# Patient Record
Sex: MALE | Race: BLACK OR AFRICAN AMERICAN | NOT HISPANIC OR LATINO | Employment: FULL TIME | URBAN - METROPOLITAN AREA
[De-identification: names, ages, dates, MRNs, and addresses within clinical notes are randomized per-mention and may not be internally consistent; named-entity substitution may affect disease eponyms.]

---

## 2018-12-02 ENCOUNTER — OFFICE VISIT (OUTPATIENT)
Dept: URGENT CARE | Facility: CLINIC | Age: 43
End: 2018-12-02
Payer: COMMERCIAL

## 2018-12-02 VITALS
WEIGHT: 180 LBS | DIASTOLIC BLOOD PRESSURE: 78 MMHG | BODY MASS INDEX: 25.77 KG/M2 | OXYGEN SATURATION: 98 % | HEIGHT: 70 IN | RESPIRATION RATE: 16 BRPM | SYSTOLIC BLOOD PRESSURE: 123 MMHG | TEMPERATURE: 99 F | HEART RATE: 82 BPM

## 2018-12-02 DIAGNOSIS — S05.8X1A ABRASION OF RIGHT EYE, INITIAL ENCOUNTER: Primary | ICD-10-CM

## 2018-12-02 PROCEDURE — 99203 OFFICE O/P NEW LOW 30 MIN: CPT | Mod: S$GLB,,, | Performed by: NURSE PRACTITIONER

## 2018-12-02 RX ORDER — MUPIROCIN 20 MG/G
OINTMENT TOPICAL 3 TIMES DAILY
Qty: 22 G | Refills: 0 | Status: SHIPPED | OUTPATIENT
Start: 2018-12-02

## 2018-12-02 NOTE — PROGRESS NOTES
"Subjective:       Patient ID: Silas Stallings is a 43 y.o. male.    Vitals:  height is 5' 10" (1.778 m) and weight is 81.6 kg (180 lb). His temperature is 98.6 °F (37 °C). His blood pressure is 123/78 and his pulse is 82. His respiration is 16 and oxygen saturation is 98%.     Chief Complaint: Eye Injury and Facial Injury    Pt states hit the corner of his right eye on the corner of a car door around 11 pm last night. Pt reports minimal bleeding at time of incident and denies any pain to orbital bones. Pt reports tetanus is UTD.      Eye Injury    The right eye is affected. This is a new problem. The current episode started yesterday. The problem occurs intermittently. The problem has been unchanged. The injury mechanism was a direct trauma. The pain is at a severity of 7/10. The pain is moderate. There is no known exposure to pink eye. He does not wear contacts. Pertinent negatives include no blurred vision or double vision. He has tried nothing for the symptoms.   Facial Injury    Pertinent negatives include no blurred vision.       Constitution: Negative for fatigue.   HENT: Negative for facial swelling and facial trauma.    Neck: Negative for neck stiffness.   Cardiovascular: Negative for chest trauma.   Eyes: Positive for eye trauma and eye pain. Negative for double vision and blurred vision.   Gastrointestinal: Negative for abdominal trauma, abdominal pain and rectal bleeding.   Genitourinary: Negative for hematuria, genital trauma and pelvic pain.   Musculoskeletal: Positive for pain. Negative for trauma, joint swelling, abnormal ROM of joint and pain with walking.   Skin: Negative for color change, wound, abrasion, laceration and erythema.   Neurological: Negative for dizziness, history of vertigo, light-headedness, coordination disturbances, altered mental status and loss of consciousness.   Hematologic/Lymphatic: Negative for history of bleeding disorder.   Psychiatric/Behavioral: Negative for altered " mental status.       Objective:      Physical Exam   Constitutional: He is oriented to person, place, and time. He appears well-developed and well-nourished.   HENT:   Head: Normocephalic. Head is with abrasion. Head is without contusion and without laceration.       Right Ear: External ear normal.   Left Ear: External ear normal.   Nose: Nose normal.   Mouth/Throat: Oropharynx is clear and moist.   Eyes: Conjunctivae, EOM and lids are normal. Pupils are equal, round, and reactive to light.   Neck: Trachea normal, full passive range of motion without pain and phonation normal. Neck supple.   Cardiovascular: Normal rate, regular rhythm and normal heart sounds.   Pulmonary/Chest: Effort normal and breath sounds normal. No stridor. No respiratory distress.   Musculoskeletal: Normal range of motion.   Neurological: He is alert and oriented to person, place, and time.   Skin: Skin is warm and dry. Capillary refill takes less than 2 seconds. Abrasion noted. No bruising, no burn, no ecchymosis, no laceration, no lesion and no rash noted. No erythema.   Psychiatric: He has a normal mood and affect. His speech is normal and behavior is normal. Judgment and thought content normal. Cognition and memory are normal.   Nursing note and vitals reviewed.      Assessment:       1. Abrasion of right eye, initial encounter        Plan:         Abrasion of right eye, initial encounter  -     mupirocin (BACTROBAN) 2 % ointment; Apply topically 3 (three) times daily.  Dispense: 22 g; Refill: 0      Patient Instructions       COVER WHEN IN DIRTY ENVIRONMENT     USE BACTROBAN OINTMENT THREE TIMES A DAY FOR 3 DAYS    Abrasions  Abrasions are skin scrapes. Their treatment depends on how large and deep the abrasion is.  Home care  You may be prescribed an antibiotic cream or ointment to apply to the wound. This helps prevent infection. Follow instructions when using this medicine.  General care  · To care for the abrasion, do the following  each day for as long as directed by your healthcare provider.  ¨ If you were given a bandage, change it once a day. If your bandage sticks to the wound, soak it in warm water until it loosens.  ¨ Wash the area with soap and warm water. You may do this in a sink or under a tub faucet or shower. Rinse off the soap. Then pat the area dry with a clean towel.  ¨ If antibiotic ointment or cream was prescribed, reapply it to the wound as directed. Cover the wound with a fresh nonstick bandage. If the bandage becomes wet or dirty, change it as soon as possible.  ¨ Some antibiotic ointments or cream can cause an allergic reaction or dermatitis. This may cause redness, itching and or hives. If this occurs, stop using the ointment immediately and wash off any remaining ointment. You may need to take some allergy medicine to relieve symptoms.  · You may use acetaminophen or ibuprofen to control pain unless another pain medicine was prescribed. Talk with your healthcare provider before using these medicines if you have chronic liver or kidney disease or ever had a stomach ulcer or GI bleeding. Dont use ibuprofen in children younger than six months old.  · Most skin wounds heal within 10 days. But an infection may occur even with treatment. So its important to watch the wound for signs of infection as listed below.  Follow-up care  Follow up with your healthcare provider, or as advised.  When to seek medical advice  Call your healthcare provider right away if any of these occur:  · Fever of 100.4ºF (38ºC) or higher, or as directed by your healthcare provider  · Increasing pain, redness, swelling, or drainage from the wound  · Bleeding from the wound that does not stop after a few minutes of steady, firm pressure  · Decreased ability to move any body part near the wound  Date Last Reviewed: 3/3/2017  © 5863-9124 [employer]. [address] [place] [address], [place] [address]. All rights reserved. This information is not  intended as a substitute for professional medical care. Always follow your healthcare professional's instructions.

## 2018-12-02 NOTE — PATIENT INSTRUCTIONS
COVER WHEN IN DIRTY ENVIRONMENT     USE BACTROBAN OINTMENT THREE TIMES A DAY FOR 3 DAYS    Abrasions  Abrasions are skin scrapes. Their treatment depends on how large and deep the abrasion is.  Home care  You may be prescribed an antibiotic cream or ointment to apply to the wound. This helps prevent infection. Follow instructions when using this medicine.  General care  · To care for the abrasion, do the following each day for as long as directed by your healthcare provider.  ¨ If you were given a bandage, change it once a day. If your bandage sticks to the wound, soak it in warm water until it loosens.  ¨ Wash the area with soap and warm water. You may do this in a sink or under a tub faucet or shower. Rinse off the soap. Then pat the area dry with a clean towel.  ¨ If antibiotic ointment or cream was prescribed, reapply it to the wound as directed. Cover the wound with a fresh nonstick bandage. If the bandage becomes wet or dirty, change it as soon as possible.  ¨ Some antibiotic ointments or cream can cause an allergic reaction or dermatitis. This may cause redness, itching and or hives. If this occurs, stop using the ointment immediately and wash off any remaining ointment. You may need to take some allergy medicine to relieve symptoms.  · You may use acetaminophen or ibuprofen to control pain unless another pain medicine was prescribed. Talk with your healthcare provider before using these medicines if you have chronic liver or kidney disease or ever had a stomach ulcer or GI bleeding. Dont use ibuprofen in children younger than six months old.  · Most skin wounds heal within 10 days. But an infection may occur even with treatment. So its important to watch the wound for signs of infection as listed below.  Follow-up care  Follow up with your healthcare provider, or as advised.  When to seek medical advice  Call your healthcare provider right away if any of these occur:  · Fever of 100.4ºF (38ºC) or  higher, or as directed by your healthcare provider  · Increasing pain, redness, swelling, or drainage from the wound  · Bleeding from the wound that does not stop after a few minutes of steady, firm pressure  · Decreased ability to move any body part near the wound  Date Last Reviewed: 3/3/2017  © 2550-3736 [employer]. [address] [place] [address], [place] [address]. All rights reserved. This information is not intended as a substitute for professional medical care. Always follow your healthcare professional's instructions.

## 2022-10-05 ENCOUNTER — TELEPHONE (OUTPATIENT)
Dept: SPORTS MEDICINE | Facility: CLINIC | Age: 47
End: 2022-10-05
Payer: COMMERCIAL

## 2022-10-05 NOTE — TELEPHONE ENCOUNTER
I called the patient to schedule appointment for knee pain. Mr. Stallings didn't answer. I left a message to please call back.

## 2022-10-06 ENCOUNTER — HOSPITAL ENCOUNTER (OUTPATIENT)
Dept: RADIOLOGY | Facility: HOSPITAL | Age: 47
Discharge: HOME | End: 2022-10-06
Attending: ORTHOPAEDIC SURGERY
Payer: COMMERCIAL

## 2022-10-06 ENCOUNTER — OFFICE VISIT (OUTPATIENT)
Dept: SPORTS MEDICINE | Facility: CLINIC | Age: 47
End: 2022-10-06
Payer: COMMERCIAL

## 2022-10-06 VITALS
HEIGHT: 70 IN | DIASTOLIC BLOOD PRESSURE: 86 MMHG | HEART RATE: 84 BPM | BODY MASS INDEX: 25.83 KG/M2 | SYSTOLIC BLOOD PRESSURE: 130 MMHG

## 2022-10-06 DIAGNOSIS — M17.0 BILATERAL PRIMARY OSTEOARTHRITIS OF KNEE: Primary | ICD-10-CM

## 2022-10-06 DIAGNOSIS — M25.561 PAIN IN BOTH KNEES, UNSPECIFIED CHRONICITY: ICD-10-CM

## 2022-10-06 DIAGNOSIS — M25.562 PAIN IN BOTH KNEES, UNSPECIFIED CHRONICITY: ICD-10-CM

## 2022-10-06 PROCEDURE — 99203 PR OFFICE/OUTPT VISIT, NEW, LEVL III, 30-44 MIN: ICD-10-PCS | Mod: S$GLB,,, | Performed by: ORTHOPAEDIC SURGERY

## 2022-10-06 PROCEDURE — 99999 PR PBB SHADOW E&M-EST. PATIENT-LVL III: ICD-10-PCS | Mod: PBBFAC,,, | Performed by: ORTHOPAEDIC SURGERY

## 2022-10-06 PROCEDURE — 73564 XR KNEE ORTHO BILAT WITH FLEXION: ICD-10-PCS | Mod: 26,50,, | Performed by: RADIOLOGY

## 2022-10-06 PROCEDURE — 73564 X-RAY EXAM KNEE 4 OR MORE: CPT | Mod: 26,50,, | Performed by: RADIOLOGY

## 2022-10-06 PROCEDURE — 99203 OFFICE O/P NEW LOW 30 MIN: CPT | Mod: S$GLB,,, | Performed by: ORTHOPAEDIC SURGERY

## 2022-10-06 PROCEDURE — 99999 PR PBB SHADOW E&M-EST. PATIENT-LVL III: CPT | Mod: PBBFAC,,, | Performed by: ORTHOPAEDIC SURGERY

## 2022-10-06 PROCEDURE — 73564 X-RAY EXAM KNEE 4 OR MORE: CPT | Mod: TC,50

## 2022-10-06 RX ORDER — MELOXICAM 15 MG/1
15 TABLET ORAL DAILY
Qty: 30 TABLET | Refills: 2 | Status: SHIPPED | OUTPATIENT
Start: 2022-10-06 | End: 2023-01-18 | Stop reason: SDUPTHER

## 2022-10-06 NOTE — PROGRESS NOTES
CC: Right knee pain    47 y.o. Male with a history of right knee pain with history of right ACL reconstruction 25 years ago in college.  This was followed by an arthroscopic lysis of adhesions 2 years later.  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  He complains of several years of right knee pain secondary to advanced arthritis.  Today he reports that his left knee is hurting equally as bad as his right knee.  This is affecting bilateral hips and lumbar back as well.  He is having issues ambulating.  He is having issues at work due to pain and difficulty with mobility.  He has tried corticosteroid injections, and viscosupplementation in the past with mild relief with an outside orthopedist.  He reports some mechanical symptoms.  He denies any instability..  He states that the pain is severe and not responding to any conservative care.      He reports that the pain and weakness. It also bothers him at night.    + mechanical symptoms, - instability    Is affecting ADLs.  Pain is 10/10 at it's worst.    REVIEW OF SYSTEMS:   Constitution: Negative. Negative for chills, fever and night sweats.   HENT: Negative for congestion and headaches.    Eyes: Negative for blurred vision, left vision loss and right vision loss.   Cardiovascular: Negative for chest pain and syncope.   Respiratory: Negative for cough and shortness of breath.    Endocrine: Negative for polydipsia, polyphagia and polyuria.   Hematologic/Lymphatic: Negative for bleeding problem. Does not bruise/bleed easily.   Skin: Negative for dry skin, itching and rash.   Musculoskeletal: Negative for falls. Positive for right knee pain and  muscle weakness.   Gastrointestinal: Negative for abdominal pain and bowel incontinence.   Genitourinary: Negative for bladder incontinence and nocturia.   Neurological: Negative for disturbances in coordination, loss of balance and  "seizures.   Psychiatric/Behavioral: Negative for depression. The patient does not have insomnia.    Allergic/Immunologic: Negative for hives and persistent infections.     PAST MEDICAL HISTORY:   History reviewed. No pertinent past medical history.    PAST SURGICAL HISTORY:   History reviewed. No pertinent surgical history.    FAMILY HISTORY:   History reviewed. No pertinent family history.    SOCIAL HISTORY:   Social History     Socioeconomic History    Marital status: Single   Tobacco Use    Smoking status: Every Day       MEDICATIONS:     Current Outpatient Medications:     mupirocin (BACTROBAN) 2 % ointment, Apply topically 3 (three) times daily., Disp: 22 g, Rfl: 0    ALLERGIES:   Review of patient's allergies indicates:  No Known Allergies    VITAL SIGNS:   /86   Pulse 84   Ht 5' 10" (1.778 m)   BMI 25.83 kg/m²      PHYSICAL EXAMINATION:  General:  The patient is alert and oriented x 3.  Mood is pleasant.  Observation of ears, eyes and nose reveal no gross abnormalities.  No labored breathing observed.    RIGHT KNEE EXAMINATION     OBSERVATION / INSPECTION   Gait:   Nonantalgic   Alignment:  Neutral   Scars:   None   Muscle atrophy: Mild  Effusion:  None   Warmth:  None   Discoloration:   none     TENDERNESS / CREPITUS (T / C):          T / C      T / C   Patella   - / -   Lateral joint line   - / -   Peripatellar medial  -  Medial joint line    - / -   Peripatellar lateral -  Medial plica   - / -   Patellar tendon -   Popliteal fossa  - / -   Quad tendon   -   Gastrocnemius   -   Prepatellar Bursa - / -   Quadricep   -   Tibial tubercle  -  Thigh/hamstring  -   Pes anserine/HS -  Fibula    -   ITB   - / -  Tibia     -   Tib/fib joint  - / -  LCL    -     MFC   - / -   MCL: Proximal  -    LFC   - / -    Distal   -          ROM: (* = pain)  PASSIVE   ACTIVE    Left :   5 / 0 / 145   5 / 0 / 145     Right :    5 / 0 / 120   5 / 0 / 110    Patellofemoral examination:  See above noted areas of " tenderness.   Patella position    Subluxation / dislocation: Centered           Sup. / Inf;   Normal   Crepitus (PF):    Absent   Patellar Mobility:       Medial-lateral:   Normal    Superior-inferior:  Normal    Inferior tilt   Normal    Patellar tendon:  Normal   Lateral tilt:    Normal   J-sign:     None   Patellofemoral grind:   No pain       MENISCAL SIGNS:     Pain on terminal extension:  -  Pain on terminal flexion:  -  Hipolitos maneuver:  + (for crepitus)  Squat     -     LIGAMENT EXAMINATION:  ACL / Lachman:  normal (-1 to 2mm)    PCL-Post.  drawer: normal 0 to 2mm  MCL- Valgus:  normal 0 to 2mm  LCL- Varus:  normal 0 to 2mm  Pivot shift: normal (Equal)   Dial Test: difference c/w other side   At 30° flexion: normal (< 5°)    At 90° flexion: normal (< 5°)   Reverse Pivot Shift:   normal (Equal)     STRENGTH: (* = with pain) PAINFUL SIDE   Quadricep   5/5   Hamstrin/5    EXTREMITY NEURO-VASCULAR EXAMINATION:   Sensation:  Grossly intact to light touch all dermatomal regions.   Motor Function:  Fully intact motor function at hip, knee, foot and ankle    DTRs;  quadriceps and  achilles 2+.  No clonus and downgoing Babinski.    Vascular status:  DP and PT pulses 2+, brisk capillary refill, symmetric.     OTHER FINDINGS:  none    IMAGING:    X-rays including standing, weight bearing AP and flexion bilateral knees, lateral and merchant views ordered and images reviewed by me show:    No fracture, dislocation or other pathology   Medial compartment: severe right degenerative changes   Lateral compartment: severe right degenerative changes   Patellofemoral compartment: severe right degenerative changes        ASSESSMENT:    Right Knee Pain, Probable advanced osteoarthritis     PLAN:   1. PT   2. Euflexxa prior auth  3. Referral to joints clinic       All questions were answered, pt will contact us for questions or concerns in the interim.

## 2022-10-25 ENCOUNTER — OFFICE VISIT (OUTPATIENT)
Dept: SPORTS MEDICINE | Facility: CLINIC | Age: 47
End: 2022-10-25
Payer: COMMERCIAL

## 2022-10-25 VITALS
DIASTOLIC BLOOD PRESSURE: 87 MMHG | SYSTOLIC BLOOD PRESSURE: 125 MMHG | WEIGHT: 180 LBS | HEIGHT: 70 IN | HEART RATE: 77 BPM | BODY MASS INDEX: 25.77 KG/M2

## 2022-10-25 DIAGNOSIS — M17.0 BILATERAL PRIMARY OSTEOARTHRITIS OF KNEE: Primary | ICD-10-CM

## 2022-10-25 PROCEDURE — 99999 PR PBB SHADOW E&M-EST. PATIENT-LVL III: CPT | Mod: PBBFAC,,, | Performed by: ORTHOPAEDIC SURGERY

## 2022-10-25 PROCEDURE — 99499 UNLISTED E&M SERVICE: CPT | Mod: S$GLB,,, | Performed by: ORTHOPAEDIC SURGERY

## 2022-10-25 PROCEDURE — 20610 DRAIN/INJ JOINT/BURSA W/O US: CPT | Mod: 50,S$GLB,, | Performed by: ORTHOPAEDIC SURGERY

## 2022-10-25 PROCEDURE — 20610 LARGE JOINT ASPIRATION/INJECTION: BILATERAL KNEE: ICD-10-PCS | Mod: 50,S$GLB,, | Performed by: ORTHOPAEDIC SURGERY

## 2022-10-25 PROCEDURE — 99499 NO LOS: ICD-10-PCS | Mod: S$GLB,,, | Performed by: ORTHOPAEDIC SURGERY

## 2022-10-25 PROCEDURE — 99999 PR PBB SHADOW E&M-EST. PATIENT-LVL III: ICD-10-PCS | Mod: PBBFAC,,, | Performed by: ORTHOPAEDIC SURGERY

## 2022-10-25 NOTE — PROGRESS NOTES
Patient is here for follow up of bilateral knee arthritis. Pt is requesting Euflexxa injection #1.    Patient has painful DJD of bilateral knee and has failed other conservative modalities including NSAIDS, activity modification, weight loss and rehabilitation exercises.         The prior shots were tolerated well.  RADIOGRAPHIC IMAGING:     Kellgren-Kehinde Grade 2.   PHYSICAL EXAMINATION:      General: The patient is alert and oriented x 3. Mood is pleasant.   Observation of ears, eyes and nose reveals no gross abnormalities. No   labored breathing observed.      No signs of infection or adverse reaction to knee.      Euflexxa Injection Procedure #1     A time out was performed, including verification of patient ID, procedure, site and side, availability of information and equipment, review of safety issues, and agreement with consent, the procedure site was marked.     The patient was prepped aseptically with povidone-iodine swabsticks. A diagnostic and therapeutic injection of 2cc Euflexxa was given under sterile technique using a 21.5g x 1.5 needle from the superolateral aspect of the bilateral knee joint in the supine position.       He had no adverse reactions to the medication. Pain decreased. he   was instructed to apply ice to the joint for 20 minutes and avoid strenuous activities for 24-36 hours following the injection. he   was warned of possible blood pressure changes during that time. Following that time, he can resume activities as prior to the injection.     he was reminded to call the clinic immediately for any adverse side effects as explained in clinic today.     Lot: [id number]  Exp: 9/19/2023

## 2022-10-25 NOTE — PROCEDURES
Large Joint Aspiration/Injection: bilateral knee    Date/Time: 10/25/2022 1:15 PM  Performed by: Ricky Menjivar MD  Authorized by: Ricky Menjivar MD     Consent Done?:  Yes (Verbal)  Indications:  Pain  Site marked: the procedure site was marked    Timeout: prior to procedure the correct patient, procedure, and site was verified    Prep: patient was prepped and draped in usual sterile fashion      Details:  Needle Size:  22 G  Ultrasonic Guidance for needle placement?: No    Approach:  Superior  Location:  Knee  Laterality:  Bilateral  Site:  Bilateral knee  Medications (Right):  20 mg sodium hyaluronate (EUFLEXXA) 10 mg/mL(mw 2.4 -3.6 million)  Medications (Left):  20 mg sodium hyaluronate (EUFLEXXA) 10 mg/mL(mw 2.4 -3.6 million)  Patient tolerance:  Patient tolerated the procedure well with no immediate complications

## 2022-11-03 ENCOUNTER — OFFICE VISIT (OUTPATIENT)
Dept: SPORTS MEDICINE | Facility: CLINIC | Age: 47
End: 2022-11-03
Payer: COMMERCIAL

## 2022-11-03 VITALS — WEIGHT: 180 LBS | HEIGHT: 70 IN | BODY MASS INDEX: 25.77 KG/M2

## 2022-11-03 DIAGNOSIS — M17.0 BILATERAL PRIMARY OSTEOARTHRITIS OF KNEE: Primary | ICD-10-CM

## 2022-11-03 PROCEDURE — 99499 UNLISTED E&M SERVICE: CPT | Mod: S$GLB,,, | Performed by: ORTHOPAEDIC SURGERY

## 2022-11-03 PROCEDURE — 20610 LARGE JOINT ASPIRATION/INJECTION: BILATERAL KNEE: ICD-10-PCS | Mod: 50,S$GLB,, | Performed by: ORTHOPAEDIC SURGERY

## 2022-11-03 PROCEDURE — 99999 PR PBB SHADOW E&M-EST. PATIENT-LVL II: ICD-10-PCS | Mod: PBBFAC,,, | Performed by: ORTHOPAEDIC SURGERY

## 2022-11-03 PROCEDURE — 99499 NO LOS: ICD-10-PCS | Mod: S$GLB,,, | Performed by: ORTHOPAEDIC SURGERY

## 2022-11-03 PROCEDURE — 20610 DRAIN/INJ JOINT/BURSA W/O US: CPT | Mod: 50,S$GLB,, | Performed by: ORTHOPAEDIC SURGERY

## 2022-11-03 PROCEDURE — 99999 PR PBB SHADOW E&M-EST. PATIENT-LVL II: CPT | Mod: PBBFAC,,, | Performed by: ORTHOPAEDIC SURGERY

## 2022-11-03 NOTE — PROCEDURES
Large Joint Aspiration/Injection: bilateral knee    Date/Time: 11/3/2022 9:30 AM  Performed by: Ricky Menjivar MD  Authorized by: Ricky Menjivar MD     Consent Done?:  Yes (Verbal)  Indications:  Pain  Site marked: the procedure site was marked    Timeout: prior to procedure the correct patient, procedure, and site was verified    Prep: patient was prepped and draped in usual sterile fashion      Details:  Needle Size:  22 G  Ultrasonic Guidance for needle placement?: No    Approach:  Superior  Location:  Knee  Laterality:  Bilateral  Site:  Bilateral knee  Medications (Right):  20 mg sodium hyaluronate (EUFLEXXA) 10 mg/mL(mw 2.4 -3.6 million)  Medications (Left):  20 mg sodium hyaluronate (EUFLEXXA) 10 mg/mL(mw 2.4 -3.6 million)  Patient tolerance:  Patient tolerated the procedure well with no immediate complications

## 2022-11-03 NOTE — PROGRESS NOTES
Patient is here for follow up of bilateral knee arthritis. Pt is requesting Euflexxa injection #2.    Patient has painful DJD of bilateral knee and has failed other conservative modalities including NSAIDS, activity modification, weight loss and rehabilitation exercises.         The prior shots were tolerated well.  RADIOGRAPHIC IMAGING:     Kellgren-Kehinde Grade 2.   PHYSICAL EXAMINATION:      General: The patient is alert and oriented x 3. Mood is pleasant.   Observation of ears, eyes and nose reveals no gross abnormalities. No   labored breathing observed.      No signs of infection or adverse reaction to knee.      Euflexxa Injection Procedure #2     A time out was performed, including verification of patient ID, procedure, site and side, availability of information and equipment, review of safety issues, and agreement with consent, the procedure site was marked.     The patient was prepped aseptically with povidone-iodine swabsticks. A diagnostic and therapeutic injection of 2cc Euflexxa was given under sterile technique using a 21.5g x 1.5 needle from the superolateral aspect of the bilateral knee joint in the supine position.       He had no adverse reactions to the medication. Pain decreased. he   was instructed to apply ice to the joint for 20 minutes and avoid strenuous activities for 24-36 hours following the injection. he   was warned of possible blood pressure changes during that time. Following that time, he can resume activities as prior to the injection.     he was reminded to call the clinic immediately for any adverse side effects as explained in clinic today.     Lot: [id number]  Exp: 9/05/2023

## 2022-11-18 ENCOUNTER — OFFICE VISIT (OUTPATIENT)
Dept: SPORTS MEDICINE | Facility: CLINIC | Age: 47
End: 2022-11-18
Payer: COMMERCIAL

## 2022-11-18 VITALS
DIASTOLIC BLOOD PRESSURE: 86 MMHG | HEIGHT: 70 IN | BODY MASS INDEX: 25.77 KG/M2 | WEIGHT: 180 LBS | SYSTOLIC BLOOD PRESSURE: 140 MMHG

## 2022-11-18 DIAGNOSIS — M17.0 BILATERAL PRIMARY OSTEOARTHRITIS OF KNEE: Primary | ICD-10-CM

## 2022-11-18 PROCEDURE — 99499 NO LOS: ICD-10-PCS | Mod: S$GLB,,, | Performed by: ORTHOPAEDIC SURGERY

## 2022-11-18 PROCEDURE — 20610 DRAIN/INJ JOINT/BURSA W/O US: CPT | Mod: 50,S$GLB,, | Performed by: ORTHOPAEDIC SURGERY

## 2022-11-18 PROCEDURE — 20610 LARGE JOINT ASPIRATION/INJECTION: BILATERAL KNEE: ICD-10-PCS | Mod: 50,S$GLB,, | Performed by: ORTHOPAEDIC SURGERY

## 2022-11-18 PROCEDURE — 99999 PR PBB SHADOW E&M-EST. PATIENT-LVL III: CPT | Mod: PBBFAC,,, | Performed by: ORTHOPAEDIC SURGERY

## 2022-11-18 PROCEDURE — 99999 PR PBB SHADOW E&M-EST. PATIENT-LVL III: ICD-10-PCS | Mod: PBBFAC,,, | Performed by: ORTHOPAEDIC SURGERY

## 2022-11-18 PROCEDURE — 99499 UNLISTED E&M SERVICE: CPT | Mod: S$GLB,,, | Performed by: ORTHOPAEDIC SURGERY

## 2022-11-18 NOTE — PROCEDURES
Large Joint Aspiration/Injection: bilateral knee    Date/Time: 11/18/2022 9:30 AM  Performed by: Ricky Menjivar MD  Authorized by: Ricky Menjivar MD     Consent Done?:  Yes (Verbal)  Indications:  Pain  Site marked: the procedure site was marked    Timeout: prior to procedure the correct patient, procedure, and site was verified    Prep: patient was prepped and draped in usual sterile fashion      Details:  Needle Size:  22 G  Ultrasonic Guidance for needle placement?: No    Approach:  Superior  Location:  Knee  Laterality:  Bilateral  Site:  Bilateral knee  Medications (Right):  20 mg sodium hyaluronate (EUFLEXXA) 10 mg/mL(mw 2.4 -3.6 million)  Medications (Left):  20 mg sodium hyaluronate (EUFLEXXA) 10 mg/mL(mw 2.4 -3.6 million)  Patient tolerance:  Patient tolerated the procedure well with no immediate complications

## 2022-11-18 NOTE — PROGRESS NOTES
Patient is here for follow up of bilateral knee arthritis. Pt is requesting Euflexxa injection #3.    Patient has painful DJD of bilateral knee and has failed other conservative modalities including NSAIDS, activity modification, weight loss and rehabilitation exercises.         The prior shots were tolerated well.  RADIOGRAPHIC IMAGING:     Kellgren-Kehinde Grade 2.   PHYSICAL EXAMINATION:      General: The patient is alert and oriented x 3. Mood is pleasant.   Observation of ears, eyes and nose reveals no gross abnormalities. No   labored breathing observed.      No signs of infection or adverse reaction to knee.      Euflexxa Injection Procedure #3     A time out was performed, including verification of patient ID, procedure, site and side, availability of information and equipment, review of safety issues, and agreement with consent, the procedure site was marked.     The patient was prepped aseptically with povidone-iodine swabsticks. A diagnostic and therapeutic injection of 2cc Euflexxa was given under sterile technique using a 21.5g x 1.5 needle from the superolateral aspect of the bilateral knee joint in the supine position.       He had no adverse reactions to the medication. Pain decreased. he   was instructed to apply ice to the joint for 20 minutes and avoid strenuous activities for 24-36 hours following the injection. he   was warned of possible blood pressure changes during that time. Following that time, he can resume activities as prior to the injection.     he was reminded to call the clinic immediately for any adverse side effects as explained in clinic today.     Lot: [id number]  Exp: 9/05/2023

## 2023-08-03 ENCOUNTER — HOSPITAL ENCOUNTER (OUTPATIENT)
Dept: RADIOLOGY | Facility: HOSPITAL | Age: 48
Discharge: HOME | End: 2023-08-03
Attending: ORTHOPAEDIC SURGERY
Payer: COMMERCIAL

## 2023-08-03 ENCOUNTER — OFFICE VISIT (OUTPATIENT)
Dept: SPORTS MEDICINE | Facility: CLINIC | Age: 48
End: 2023-08-03
Payer: COMMERCIAL

## 2023-08-03 ENCOUNTER — DOCUMENTATION ONLY (OUTPATIENT)
Dept: SPORTS MEDICINE | Facility: CLINIC | Age: 48
End: 2023-08-03

## 2023-08-03 VITALS
SYSTOLIC BLOOD PRESSURE: 110 MMHG | HEIGHT: 70 IN | BODY MASS INDEX: 25.56 KG/M2 | DIASTOLIC BLOOD PRESSURE: 75 MMHG | WEIGHT: 178.56 LBS | HEART RATE: 76 BPM

## 2023-08-03 DIAGNOSIS — M25.561 RIGHT KNEE PAIN, UNSPECIFIED CHRONICITY: ICD-10-CM

## 2023-08-03 DIAGNOSIS — M25.561 PAIN IN BOTH KNEES, UNSPECIFIED CHRONICITY: ICD-10-CM

## 2023-08-03 DIAGNOSIS — M17.0 BILATERAL PRIMARY OSTEOARTHRITIS OF KNEE: Primary | ICD-10-CM

## 2023-08-03 DIAGNOSIS — M25.562 PAIN IN BOTH KNEES, UNSPECIFIED CHRONICITY: ICD-10-CM

## 2023-08-03 PROCEDURE — 99999 PR PBB SHADOW E&M-EST. PATIENT-LVL III: CPT | Mod: PBBFAC,,, | Performed by: ORTHOPAEDIC SURGERY

## 2023-08-03 PROCEDURE — 73564 XR KNEE ORTHO BILAT WITH FLEXION: ICD-10-PCS | Mod: 26,50,, | Performed by: RADIOLOGY

## 2023-08-03 PROCEDURE — 73564 X-RAY EXAM KNEE 4 OR MORE: CPT | Mod: 26,50,, | Performed by: RADIOLOGY

## 2023-08-03 PROCEDURE — 99214 OFFICE O/P EST MOD 30 MIN: CPT | Mod: S$GLB,,, | Performed by: ORTHOPAEDIC SURGERY

## 2023-08-03 PROCEDURE — 73564 X-RAY EXAM KNEE 4 OR MORE: CPT | Mod: TC,50

## 2023-08-03 PROCEDURE — 99999 PR PBB SHADOW E&M-EST. PATIENT-LVL III: ICD-10-PCS | Mod: PBBFAC,,, | Performed by: ORTHOPAEDIC SURGERY

## 2023-08-03 PROCEDURE — 99214 PR OFFICE/OUTPT VISIT, EST, LEVL IV, 30-39 MIN: ICD-10-PCS | Mod: S$GLB,,, | Performed by: ORTHOPAEDIC SURGERY

## 2023-08-03 RX ORDER — MELOXICAM 15 MG/1
15 TABLET ORAL DAILY
Qty: 30 TABLET | Refills: 2 | Status: SHIPPED | OUTPATIENT
Start: 2023-08-03

## 2023-08-03 NOTE — PROGRESS NOTES
CC: Right knee pain    Patient presents to clinic to follow up on right knee pain. He reports constant aching pain and mechanical symptoms with ambulating. He reports pain at night as well. He reports about 6 months of moderate relief with viscosupplementation that was performed in 11/2022. He reports that his pain in his hips and lumbar back have decreased since his last visit. He denies any instability.     Initial Hx (10/6/22):  47 y.o. Male with a history of right knee pain with history of right ACL reconstruction 25 years ago in college.  This was followed by an arthroscopic lysis of adhesions 2 years later.  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  He complains of several years of right knee pain secondary to advanced arthritis.  Today he reports that his left knee is hurting equally as bad as his right knee.  This is affecting bilateral hips and lumbar back as well.  He is having issues ambulating.  He is having issues at work due to pain and difficulty with mobility.  He has tried corticosteroid injections, and viscosupplementation in the past with mild relief with an outside orthopedist.  He reports some mechanical symptoms.  He denies any instability..  He states that the pain is severe and not responding to any conservative care.      He reports that the pain and weakness. It also bothers him at night.    + mechanical symptoms, - instability    Is affecting ADLs.  Pain is 10/10 at it's worst.    REVIEW OF SYSTEMS:   Constitution: Negative. Negative for chills, fever and night sweats.   HENT: Negative for congestion and headaches.    Eyes: Negative for blurred vision, left vision loss and right vision loss.   Cardiovascular: Negative for chest pain and syncope.   Respiratory: Negative for cough and shortness of breath.    Endocrine: Negative for polydipsia, polyphagia and polyuria.   Hematologic/Lymphatic: Negative  "for bleeding problem. Does not bruise/bleed easily.   Skin: Negative for dry skin, itching and rash.   Musculoskeletal: Negative for falls. Positive for right knee pain and  muscle weakness.   Gastrointestinal: Negative for abdominal pain and bowel incontinence.   Genitourinary: Negative for bladder incontinence and nocturia.   Neurological: Negative for disturbances in coordination, loss of balance and seizures.   Psychiatric/Behavioral: Negative for depression. The patient does not have insomnia.    Allergic/Immunologic: Negative for hives and persistent infections.     PAST MEDICAL HISTORY:   No past medical history on file.    PAST SURGICAL HISTORY:   No past surgical history on file.    FAMILY HISTORY:   No family history on file.    SOCIAL HISTORY:   Social History     Socioeconomic History    Marital status: Single   Tobacco Use    Smoking status: Every Day     Current packs/day: 0.00       MEDICATIONS:     Current Outpatient Medications:     meloxicam (MOBIC) 15 MG tablet, Take 1 tablet (15 mg total) by mouth once daily., Disp: 30 tablet, Rfl: 2    mupirocin (BACTROBAN) 2 % ointment, Apply topically 3 (three) times daily. (Patient not taking: Reported on 11/3/2022), Disp: 22 g, Rfl: 0    ALLERGIES:   Review of patient's allergies indicates:  No Known Allergies    VITAL SIGNS:   /75   Pulse 76   Ht 5' 10" (1.778 m)   Wt 81 kg (178 lb 9.2 oz)   BMI 25.62 kg/m²      PHYSICAL EXAMINATION:  General:  The patient is alert and oriented x 3.  Mood is pleasant.  Observation of ears, eyes and nose reveal no gross abnormalities.  No labored breathing observed.    RIGHT KNEE EXAMINATION     OBSERVATION / INSPECTION   Gait:   Nonantalgic   Alignment:  Neutral   Scars:   None   Muscle atrophy: Mild  Effusion:  None   Warmth:  None   Discoloration:   none     TENDERNESS / CREPITUS (T / C):          T / C      T / C   Patella   + / +   Lateral joint line   - / -   Peripatellar medial  -  Medial joint line    + / " -   Peripatellar lateral -  Medial plica   - / -   Patellar tendon +   Popliteal fossa  - / -   Quad tendon   -   Gastrocnemius   -   Prepatellar Bursa - / -   Quadricep   -   Tibial tubercle  -  Thigh/hamstring  -   Pes anserine/HS -  Fibula    -   ITB   - / -  Tibia     -   Tib/fib joint  - / -  LCL    -     MFC   + / -   MCL: Proximal  -    LFC   + / -    Distal   -          ROM: (* = pain)  PASSIVE   ACTIVE    Left :   5 / 0 / 145   5 / 0 / 145     Right :     0 / 110*   0 / 90*    Patellofemoral examination:  See above noted areas of tenderness.   Patella position    Subluxation / dislocation: Centered           Sup. / Inf;   Normal   Crepitus (PF):    Present   Patellar Mobility:       Medial-lateral:   Normal    Superior-inferior:  Normal    Inferior tilt   Normal    Patellar tendon:  Normal   Lateral tilt:    Normal   J-sign:     None   Patellofemoral grind:   No pain       MENISCAL SIGNS:     Pain on terminal extension:  -  Pain on terminal flexion:  -  Hipolitos maneuver:  + (for crepitus)  Squat     + (for pain)    LIGAMENT EXAMINATION:  ACL / Lachman:  normal (-1 to 2mm)    PCL-Post.  drawer: normal 0 to 2mm  MCL- Valgus:  normal 0 to 2mm  LCL- Varus:  normal 0 to 2mm  Pivot shift: normal (Equal)   Dial Test: difference c/w other side   At 30° flexion: normal (< 5°)    At 90° flexion: normal (< 5°)   Reverse Pivot Shift:   normal (Equal)     STRENGTH: (* = with pain) PAINFUL SIDE   Quadricep   4+/5   Hamstrin/5    EXTREMITY NEURO-VASCULAR EXAMINATION:   Sensation:  Grossly intact to light touch all dermatomal regions.   Motor Function:  Fully intact motor function at hip, knee, foot and ankle    DTRs;  quadriceps and  achilles 2+.  No clonus and downgoing Babinski.    Vascular status:  DP and PT pulses 2+, brisk capillary refill, symmetric.     OTHER FINDINGS:  none    IMAGING:    X-rays including standing, weight bearing AP and flexion bilateral knees, lateral and merchant views ordered and  images reviewed by me show:    No fracture, dislocation or other pathology   Medial compartment: severe right degenerative changes   Lateral compartment: severe right degenerative changes   Patellofemoral compartment: severe right degenerative changes        ASSESSMENT:    Bilateral Knee Pain, right greater than left; advanced osteoarthritis, possible loose bodies    PLAN:   1. MRI of Right knee for surgical planning  2. Plan for Right knee arthroscopy  3. Visco prior auth placed for Left knee      All questions were answered, pt will contact us for questions or concerns in the interim.

## 2023-08-03 NOTE — LETTER
Patient: Silas Stallings   YOB: 1975   Clinic Number: [id number]   Today's Date: August 3, 2023        Certificate to Return to Work     Silas George was seen by Ricky Menjivar MD on 8/3/2023. Please excuse him from missed work today while he attended a doctor's appointment.    If you have any questions or concerns, please feel free to contact the office at [phone].    Thank you,    Ricky Menjivar MD

## 2023-08-15 ENCOUNTER — HOSPITAL ENCOUNTER (OUTPATIENT)
Dept: RADIOLOGY | Facility: HOSPITAL | Age: 48
Discharge: HOME | End: 2023-08-15
Attending: ORTHOPAEDIC SURGERY
Payer: COMMERCIAL

## 2023-08-15 DIAGNOSIS — M25.561 RIGHT KNEE PAIN, UNSPECIFIED CHRONICITY: ICD-10-CM

## 2023-08-15 PROCEDURE — 73721 MRI KNEE WITHOUT CONTRAST RIGHT: ICD-10-PCS | Mod: 26,RT,, | Performed by: RADIOLOGY

## 2023-08-15 PROCEDURE — 73721 MRI JNT OF LWR EXTRE W/O DYE: CPT | Mod: 26,RT,, | Performed by: RADIOLOGY

## 2023-08-15 PROCEDURE — 73721 MRI JNT OF LWR EXTRE W/O DYE: CPT | Mod: TC,RT

## 2023-08-17 ENCOUNTER — TELEPHONE (OUTPATIENT)
Dept: SPORTS MEDICINE | Facility: CLINIC | Age: 48
End: 2023-08-17
Payer: COMMERCIAL

## 2023-08-17 DIAGNOSIS — M23.203 OLD TEAR OF MEDIAL MENISCUS OF RIGHT KNEE, UNSPECIFIED TEAR TYPE: Primary | ICD-10-CM

## 2023-08-17 DIAGNOSIS — M23.41 LOOSE BODY OF RIGHT KNEE: ICD-10-CM

## 2023-08-17 DIAGNOSIS — M94.261 CHONDROMALACIA OF RIGHT KNEE: ICD-10-CM

## 2023-08-17 DIAGNOSIS — M23.200 OLD TEAR OF LATERAL MENISCUS OF RIGHT KNEE, UNSPECIFIED TEAR TYPE: ICD-10-CM

## 2023-08-17 NOTE — TELEPHONE ENCOUNTER
----- Message from Ramona Mcgowan sent at 8/17/2023  2:42 PM CDT -----  Regarding: appt  Contact: [phone]  Pt requesting to have surgery dated 8/21 r/s to another date. Pls call to discuss.

## 2023-08-17 NOTE — TELEPHONE ENCOUNTER
Dr. Menjivar called to discuss with  patient MRI results and surgical plan.     MRI Knee Without Contrast Right  Narrative: EXAMINATION:  MRI KNEE WITHOUT CONTRAST RIGHT    CLINICAL HISTORY:  Knee pain, chronic, positive xray (Age >= 5y);Pain in right knee    TECHNIQUE:  Multiplanar, multisequence images were performed about the RIGHT knee.    COMPARISON:  Radiograph 08/03/2023.  No prior MRIs available for comparison.    FINDINGS:  **MENISCI:    Medial meniscus: Truncation of the free edge medial meniscus predominantly body segment consistent with chronic tear versus meniscectomy.    Lateral meniscus: Truncation free edge body lateral meniscus consistent with meniscectomy, partial versus chronic tear.    Meniscal root attachment: Intact    Popliteal hiatus, superior and inferior meniscal fascicles:Intact and visualized.    **LIGAMENTS:    Anterior cruciate ligament: S/P ACL repair with marked attenuation of ACL graft, likely chronically torn with scar    Posterior cruciate ligament: Continuous, with normal signal.    Medial collateral ligament: Intact.    Lateral collateral ligament and  biceps femoris: Intact.    Iliotibial band (ITB): No evidence for ITB syndrome.    Popliteus tendon and popliteofibular ligament: Intact.    Posterior medial and posterior lateral corners: Intact.    **TENDONS:    Quadriceps tendon: Intact.    Patella tendon: Postoperative changes    Joint fluid: Physiologic.    Hoffa's fat pad: Scope scar    Intact medial retinaculum/ MPFL.    Intact lateral retinaculum.    **CARTILAGE:    Patellofemoral:Significant degenerative changes with marginal osteophytosis, and diffuse cartilaginous irregularity    Medial tibiofemoral: Cartilaginous irregularity/fissuring with central osteophytic spurring as well as marginal osteophytes..    Lateral tibiofemoral: Cartilaginous irregularity/fissuring with marginal osteophytes and lateral notch osteophytes..    **OTHER:    Bone marrow: No fracture or  marrow replacing process.    Miscellaneous: The gastrocnemius muscles are normal.No evident plica thickening.1.6 cm body low signal intensity on all imaging sequences corresponding with plain film examination of 08/03/2023, posterior joint space level.  Ossicle adjacent to the anterolateral tibial margin.  Impression: Advanced tricompartmental osteoarthritis with associated abnormality of free edge predominantly body and horn of medial and lateral meniscus loose body posterior joint space with ossicle anterolateral tibial margin    Attenuation/attritional change ACL graft.    Electronically signed by: Sergey Myles MD  Date:    08/15/2023  Time:    10:57    Surgical plan -   Treatment options were discussed with the patient about his knee.  I reviewed the MRI images with his, including the relevant anatomy, and what this means for his knee.    We discussed both non-operative and operative options for his knee and the risks and benefits of each.    I feel like he would benefit from surgery for his knee.  After a discussion of specific risks and benefits, he would like to proceed with setting this up.    These risks include: bleeding, infection, scarring, damage to neurovascular structures, damage to cartilage, stiffness, blood clots, pulmonary embolism, swelling, compartment syndrome, need for further surgery, and the risks of anesthesia.      He verbalized his understanding of these risks and wished to proceed with surgery.    A total of 25 minutes were spent face-to-face with the patient during this encounter and over half of that time was spent on counseling about treatment options including his choice for surgery and coordination of his care for his preoperative visits, surgery and post-operative rehab.  We also had a detailed discussion of his expectation level for this procedure as well as any limitations at home or work and with exercising following surgery.     The operative plan is:    right   1.  Arthroscopic partial meniscectomy  2. Possible arthroscopic synovectomy  3. Possible arthroscopic loose body removal  4. Possible arthroscopic chondroplasty    Position: Supine    Patient will not  need medical clearance prior to the pre-operative appointment.    If he wishes to proceed, we'll get his scheduled for this at his convenience around his work schedule.

## 2023-08-21 ENCOUNTER — TELEPHONE (OUTPATIENT)
Dept: SPORTS MEDICINE | Facility: CLINIC | Age: 48
End: 2023-08-21
Payer: COMMERCIAL

## 2023-08-21 NOTE — TELEPHONE ENCOUNTER
----- Message from Ginette Aguilar MA sent at 8/18/2023 12:07 PM CDT -----  Regarding: FW: reschedule request  Contact: pt [phone]  Call and reschedule surgery   ----- Message -----  From: Zhane Perez  Sent: 8/18/2023  11:04 AM CDT  To: Otto RICARDO Staff  Subject: reschedule request                               RESCHEDULE    Pt is calling to reschedule their appt, d/t insurance issues.Pls call pt at [phone].

## 2023-08-29 ENCOUNTER — TELEPHONE (OUTPATIENT)
Dept: SPORTS MEDICINE | Facility: CLINIC | Age: 48
End: 2023-08-29
Payer: COMMERCIAL

## 2023-08-29 NOTE — TELEPHONE ENCOUNTER
----- Message from Namrata Edgar sent at 8/29/2023  9:56 AM CDT -----  Regarding: procedure date  Contact: pt  Pt calling to see if he will be having his procedure on 9-11 ,  please call so pt can get things started     with his time off from work     Confirmed patient's contact info below:  Contact Name: Silas Stallings  Phone Number: [phone]

## 2023-09-06 ENCOUNTER — PATIENT MESSAGE (OUTPATIENT)
Dept: PREADMISSION TESTING | Facility: HOSPITAL | Age: 48
End: 2023-09-06
Payer: COMMERCIAL

## 2023-09-06 NOTE — ANESTHESIA PAT ROS NOTE
09/06/2023  Silas Stallings is a 48 y.o., male.      Pre-op Assessment    I have reviewed the Patient Summary Reports.       I have reviewed the Medications.     Review of Systems  Anesthesia Hx:  No problems with previous Anesthesia               Denies Personal Hx of Anesthesia complications.                    Social:  Smoker       Hematology/Oncology:  Hematology Normal   Oncology Normal                                   EENT/Dental:  EENT/Dental Normal           Cardiovascular:  Cardiovascular Normal Exercise tolerance: good       Denies CAD.       Denies Angina.       Denies ROBLERO.                            Pulmonary:  Pulmonary Normal    Denies Asthma.   Denies Shortness of breath.                  Renal/:  Renal/ Normal  Denies Chronic Renal Disease.                Hepatic/GI:  Hepatic/GI Normal     Denies GERD. Denies Liver Disease.            Musculoskeletal:  Arthritis   Old tear of medial meniscus of right knee,   Old tear of lateral meniscus of right knee,   Chondromalacia of right knee,  Loose body of right knee,  Bilateral primary osteoarthritis of knee,  H/O Right ACL Reconstruction 25 years ago, Lysis of Adhesions 2 years later              Neurological:  Neurology Normal      Denies Headaches. Denies Seizures.                                Endocrine:  Endocrine Normal Denies Diabetes. Denies Hypothyroidism.          Psych:  Psychiatric Normal                   No past medical history on file.  No past surgical history on file.      Anesthesia Assessment: Preoperative EQUATION    Planned Procedure: Procedure(s) (LRB):  ARTHROSCOPY, KNEE, WITH MENISCECTOMY (Right)  ARTHROSCOPY, KNEE, WITH CHONDROPLASTY (Right)  REMOVAL, FOREIGN BODY, LOWER EXTREMITY (Right)  Requested Anesthesia Type:General  Surgeon: Ricky Menjivar MD  Service: Orthopedics  Known or anticipated Date of  Surgery:9/11/2023    Surgeon notes: reviewed    Electronic QUestionnaire Assessment completed via nurse interview with patient.        Triage considerations:     The patient has no apparent active cardiac condition (No unstable coronary Syndrome such as severe unstable angina or recent [<1 month] myocardial infarction, decompensated CHF, severe valvular   disease or significant arrhythmia)    Previous anesthesia records:No problems and Not available    Last PCP note:  no primary care visits noted upon chart review.     Important co-morbidities: smoker            Instructions given. (See in Nurse's note)      Ht: 5'10  Wt: 178 lb  BMI: 25.83

## 2023-09-07 ENCOUNTER — OFFICE VISIT (OUTPATIENT)
Dept: SPORTS MEDICINE | Facility: CLINIC | Age: 48
End: 2023-09-07
Payer: COMMERCIAL

## 2023-09-07 ENCOUNTER — PATIENT MESSAGE (OUTPATIENT)
Dept: SPORTS MEDICINE | Facility: CLINIC | Age: 48
End: 2023-09-07
Payer: COMMERCIAL

## 2023-09-07 VITALS
HEART RATE: 101 BPM | DIASTOLIC BLOOD PRESSURE: 94 MMHG | HEIGHT: 70 IN | BODY MASS INDEX: 25.56 KG/M2 | WEIGHT: 178.56 LBS | SYSTOLIC BLOOD PRESSURE: 154 MMHG

## 2023-09-07 DIAGNOSIS — M94.261 CHONDROMALACIA OF RIGHT KNEE: ICD-10-CM

## 2023-09-07 DIAGNOSIS — M23.200 OLD TEAR OF LATERAL MENISCUS OF RIGHT KNEE, UNSPECIFIED TEAR TYPE: ICD-10-CM

## 2023-09-07 DIAGNOSIS — M23.203 OLD TEAR OF MEDIAL MENISCUS OF RIGHT KNEE, UNSPECIFIED TEAR TYPE: Primary | ICD-10-CM

## 2023-09-07 PROCEDURE — 99499 UNLISTED E&M SERVICE: CPT | Mod: S$GLB,,, | Performed by: PHYSICIAN ASSISTANT

## 2023-09-07 PROCEDURE — 99999 PR PBB SHADOW E&M-EST. PATIENT-LVL III: ICD-10-PCS | Mod: PBBFAC,,, | Performed by: PHYSICIAN ASSISTANT

## 2023-09-07 PROCEDURE — 99499 NO LOS: ICD-10-PCS | Mod: S$GLB,,, | Performed by: PHYSICIAN ASSISTANT

## 2023-09-07 PROCEDURE — 99999 PR PBB SHADOW E&M-EST. PATIENT-LVL III: CPT | Mod: PBBFAC,,, | Performed by: PHYSICIAN ASSISTANT

## 2023-09-07 RX ORDER — SODIUM CHLORIDE 9 MG/ML
INJECTION, SOLUTION INTRAVENOUS CONTINUOUS
Status: CANCELLED | OUTPATIENT
Start: 2023-09-07

## 2023-09-07 RX ORDER — TRAMADOL HYDROCHLORIDE 50 MG/1
50 TABLET ORAL EVERY 6 HOURS PRN
Qty: 20 TABLET | Refills: 0 | Status: SHIPPED | OUTPATIENT
Start: 2023-09-07 | End: 2023-10-24 | Stop reason: SDUPTHER

## 2023-09-07 RX ORDER — HYDROCODONE BITARTRATE AND ACETAMINOPHEN 7.5; 325 MG/1; MG/1
1 TABLET ORAL EVERY 6 HOURS PRN
Qty: 20 TABLET | Refills: 0 | Status: SHIPPED | OUTPATIENT
Start: 2023-09-07 | End: 2023-10-24 | Stop reason: ALTCHOICE

## 2023-09-07 RX ORDER — CEFAZOLIN SODIUM 2 G/50ML
2 SOLUTION INTRAVENOUS
Status: CANCELLED | OUTPATIENT
Start: 2023-09-07

## 2023-09-07 RX ORDER — ASPIRIN 325 MG
325 TABLET ORAL DAILY
Qty: 21 TABLET | Refills: 0 | Status: SHIPPED | OUTPATIENT
Start: 2023-09-07 | End: 2023-10-02

## 2023-09-07 RX ORDER — PROMETHAZINE HYDROCHLORIDE 25 MG/1
25 TABLET ORAL EVERY 6 HOURS PRN
Qty: 20 TABLET | Refills: 0 | Status: SHIPPED | OUTPATIENT
Start: 2023-09-07

## 2023-09-07 NOTE — H&P
"Silas Stallings  is here for a completion of his perioperative paperwork. he  Is scheduled to undergo:    right   1. Arthroscopic partial meniscectomy  2. Possible arthroscopic synovectomy  3. Possible arthroscopic loose body removal  4. Possible arthroscopic chondroplasty     on 9/11/2023.      He is a healthy individual and does not need clearance for this procedure.     PAST MEDICAL HISTORY: History reviewed. No pertinent past medical history.  PAST SURGICAL HISTORY: History reviewed. No pertinent surgical history.  FAMILY HISTORY: History reviewed. No pertinent family history.  SOCIAL HISTORY:   Social History     Socioeconomic History    Marital status: Single   Tobacco Use    Smoking status: Every Day       MEDICATIONS:   Current Outpatient Medications:     meloxicam (MOBIC) 15 MG tablet, Take 1 tablet (15 mg total) by mouth once daily., Disp: 30 tablet, Rfl: 2    mupirocin (BACTROBAN) 2 % ointment, Apply topically 3 (three) times daily. (Patient not taking: Reported on 11/3/2022), Disp: 22 g, Rfl: 0  ALLERGIES: Review of patient's allergies indicates:  No Known Allergies    VITAL SIGNS: BP (!) 154/94   Pulse 101   Ht 5' 10" (1.778 m)   Wt 81 kg (178 lb 9.2 oz)   BMI 25.62 kg/m²      Risks, indications and benefits of the surgical procedure were discussed with the patient. All questions with regard to surgery, rehab, expected return to functional activities, activities of daily living and recreational endeavors were answered to his satisfaction.    It was explained to the patient that there may be an increase in surgical risks if the patient has certain co-morbidities such as but not limited to: Obesity, Cardiovascular issues (CHF, CAD, Arrhythmias), chronic pulmonary issues, previous or current neurovascular/neurological issues, previous strokes, diabetes mellitus, previous wound healing issues, previous wound or skin infections, PVD, clotting disorders, if the patient uses chronic steroids, if the " patient takes or has immune compromising medications or diseases, or has previously or currently used tobacco products.     The patient verbalized that he/she does not have any additional clotting, bleeding, or blood disorders, other than what is list in her chart on today's review.     Then a brief history and physical exam were performed.    Review of Systems   Constitution: Negative. Negative for chills, fever and night sweats.   HENT: Negative for congestion and headaches.    Eyes: Negative for blurred vision, left vision loss and right vision loss.   Cardiovascular: Negative for chest pain and syncope.   Respiratory: Negative for cough and shortness of breath.    Endocrine: Negative for polydipsia, polyphagia and polyuria.   Hematologic/Lymphatic: Negative for bleeding problem. Does not bruise/bleed easily.   Skin: Negative for dry skin, itching and rash.   Musculoskeletal: Negative for falls and muscle weakness.   Gastrointestinal: Negative for abdominal pain and bowel incontinence.   Genitourinary: Negative for bladder incontinence and nocturia.   Neurological: Negative for disturbances in coordination, loss of balance and seizures.   Psychiatric/Behavioral: Negative for depression. The patient does not have insomnia.    Allergic/Immunologic: Negative for hives and persistent infections.     PHYSICAL EXAM:  GEN: A&Ox3, WD WN NAD  HEENT: WNL  CHEST: CTAB, no W/R/R  HEART: RRR, no M/R/G  ABD: Soft, NT ND, BS x4 QUADS  MS; See Epic  NEURO: CN II-XII intact       The surgical consent was then reviewed with the patient, who agreed with all the contents of the consent form and it was signed. he was then given the [facility] surgery packet to bring with him to surgery for the anesthesia portion of his perioperative paperwork.   For all physicians except for Dr. Menjivar, we will email and possibly fax the consent forms and booking sheets to [facility] pre-admit.    The patient was given the  opportunity to ask questions about the surgical plan and consent form, and once no other questions were asked, I proceeded with the pre-op appointment.    PHYSICAL THERAPY:  He was also instructed regarding physical therapy and will begin on POD#1 at the [place] location.     POST OP CARE:instructions were reviewed including care of the wound and dressing after surgery and when he can shower.     CRUTCHES OR WALKER: It was explained to the patient that if they are having a lower extremity surgery that they will require either a walker or crutches to ambulate safely with after surgery. It was explained that a cane or other assistive devices are not sufficient to safely ambulate with after surgery. I explained to the patient that I will place an order for them to receive either crutches or a walker after surgery to go home with. It was explained that if they have crutches or a walker at home already, that they are REQUIRED to bring them to the hospital on the day of surgery. It was explained that if they do not have them at the hospital on the day of surgery that they WILL be provided a new pair or crutches or a walker to go home with to ensure ambulation will be safe if the patient needs to stop somewhere on the way home.      PAIN MANAGEMENT: Silas Stallings was also given their pain management regimen.    PAIN MEDICATION:  Norco 7.5/325mg 1 po q 4-6 hours prn pain  Ultram 50 mg Take 1-2 p.o. q.6 hours p.r.n. breakthrough pain,   Phenergan 25 mg one p.o. q.6 hours p.r.n. nausea and vomiting.    Post op meds to be delivered bedside prior to discharge. Deliver to family if patient is in surgery at 5pm.    The patient was told that narcotic pain medications may make them drowsy and instructions were given to not sign legal documents, drive or operate heavy machinery, cars, or equipment while under the influence of narcotic medications. The patient was told and understands that narcotic pain medications should only be  used as needed to control pain and that other options of pain control include TENs unit and ice packs/unit.     Patient was instructed to purchase and take Colace to counter possible GI side effects of taking opiates.     DVT prophylaxis was discussed with the patient today including risk factors for developing DVTs and history of DVTs. The patient was asked if any specific recommendations were given from the doctor/s that did pre-operative surgical clearance. The patient was then given an education sheet about DVTs and PE with warning signs and symptoms of both and steps to take if they suspect either of these.    Patient was asked if they were taking or using OCP pills or devices. If they answered yes, then they were instructed to stop using OCPs at this pre-operative appointment until 2 months post-op to help prevent DVT development. They understand that there are other forms of birth control that do not involve hormones. They expressed understanding that ignoring/not following this instruction could result in a DVT which could turn into a deadly pulmonary embolism.     This along with the Modified Caprini risk assessment model for VTE in general surgical patients was used to determine the patient's DVT risk.     From: Tez MK, Tenzin DA, Emilie SM, et al. Prevention of VTE in nonorthopedic surgical patients: antithrombotic therapy and prevention of thrombosis, 9th ed: American College of Chest Physicians evidence-based clinical practical guidelines. Chest 2012; 141:e227S. Copyright © 2012. Reproduced with permission from the American College of Chest Physicians.    The below listed DVT prophylaxis regimen was discussed along with SCDs during surgery and bilateral LAZARO compression stockings to be used post-op. Length of treatment has been determined to be 10-42 days post-op by the above noted Caprini assessment model. Early ambulation post-op was also discussed and emphasized with the patient.     Patient was  instructed to buy and take:  Aspirin 325mg QD x 3 weeks for DVT prophylaxis starting on the evening after surgery.  Patient will also use bilateral TEDs on lower extremities, SCDs during surgery, and early ambulation post-op. If the patient was previously taking 81mg baby aspirin, they were told to not take it will using the above stated aspirin and to restart the 81mg aspirin after completion of the aspirin dose.      Patient was also told to buy over the counter Prilosec medication and take it once daily for GI protection as long as they are taking NSAIDs or Aspirin.     Published data in Nitin OKEEFE, et al. J Arthroplasty. Oct; 31(10):2237-40, 2016; showed that aspirin use as prophylaxis during revision total joint arthroplasty was more effective than warfarin in preventing symptomatic venous thromboembolic events and was associated with lower complications.  Patients in the study were also treated with intermittent pneumatic compression devices. Compression stockings would be our method of mechanical prophylaxis, which has been shown to be similar to pneumatic compression in the systematic review, Parrish RJ, et al. Greta Surg. Feb; 239(2): 162-171, 2004.     Results showed a significantly higher incidence of symptomatic venous thromboembolic events among patients in the warfarin group vs. the aspirin group (1.75% vs. 0.56%). Researchers also noted a bleeding event rate of 1.5% among patients who received warfarin compared with a rate of 0.4% among patients who received aspirin.    Patient denies history of seizures.     I explained to following and the patient expressed understanding:  The patient is currently aware of the COVID19 pandemic and that proceeding with their surgical procedure could potentially increase exposure to coronavirus in the community. The patient understands that there is the possibility of delayed or cancelled appts or PT visits in the future. They understand that infection with the  coronavirus could complicate their surgery recovery. They are aware of the current policies and procedures of [facility] and the government regarding the pandemic and they were given the option of delaying my surgery. The patient elects to proceed with surgery at this time.     The patient was instructed to practice strict social distancing, hand washing/hygiene, respiratory hygiene, and cough etiquette from now until 6 weeks following surgery to reduce the risk of irwin coronavirus.    As there were no other questions to be asked, he was given my business card along with Ricky Menjivar MD business card if he has any questions or concerns prior to surgery or in the postop period.

## 2023-09-07 NOTE — H&P (VIEW-ONLY)
"Silas Stallings  is here for a completion of his perioperative paperwork. he  Is scheduled to undergo:    right   1. Arthroscopic partial meniscectomy  2. Possible arthroscopic synovectomy  3. Possible arthroscopic loose body removal  4. Possible arthroscopic chondroplasty     on 9/11/2023.      He is a healthy individual and does not need clearance for this procedure.     PAST MEDICAL HISTORY: History reviewed. No pertinent past medical history.  PAST SURGICAL HISTORY: History reviewed. No pertinent surgical history.  FAMILY HISTORY: History reviewed. No pertinent family history.  SOCIAL HISTORY:   Social History     Socioeconomic History    Marital status: Single   Tobacco Use    Smoking status: Every Day       MEDICATIONS:   Current Outpatient Medications:     meloxicam (MOBIC) 15 MG tablet, Take 1 tablet (15 mg total) by mouth once daily., Disp: 30 tablet, Rfl: 2    mupirocin (BACTROBAN) 2 % ointment, Apply topically 3 (three) times daily. (Patient not taking: Reported on 11/3/2022), Disp: 22 g, Rfl: 0  ALLERGIES: Review of patient's allergies indicates:  No Known Allergies    VITAL SIGNS: BP (!) 154/94   Pulse 101   Ht 5' 10" (1.778 m)   Wt 81 kg (178 lb 9.2 oz)   BMI 25.62 kg/m²      Risks, indications and benefits of the surgical procedure were discussed with the patient. All questions with regard to surgery, rehab, expected return to functional activities, activities of daily living and recreational endeavors were answered to his satisfaction.    It was explained to the patient that there may be an increase in surgical risks if the patient has certain co-morbidities such as but not limited to: Obesity, Cardiovascular issues (CHF, CAD, Arrhythmias), chronic pulmonary issues, previous or current neurovascular/neurological issues, previous strokes, diabetes mellitus, previous wound healing issues, previous wound or skin infections, PVD, clotting disorders, if the patient uses chronic steroids, if the " patient takes or has immune compromising medications or diseases, or has previously or currently used tobacco products.     The patient verbalized that he/she does not have any additional clotting, bleeding, or blood disorders, other than what is list in her chart on today's review.     Then a brief history and physical exam were performed.    Review of Systems   Constitution: Negative. Negative for chills, fever and night sweats.   HENT: Negative for congestion and headaches.    Eyes: Negative for blurred vision, left vision loss and right vision loss.   Cardiovascular: Negative for chest pain and syncope.   Respiratory: Negative for cough and shortness of breath.    Endocrine: Negative for polydipsia, polyphagia and polyuria.   Hematologic/Lymphatic: Negative for bleeding problem. Does not bruise/bleed easily.   Skin: Negative for dry skin, itching and rash.   Musculoskeletal: Negative for falls and muscle weakness.   Gastrointestinal: Negative for abdominal pain and bowel incontinence.   Genitourinary: Negative for bladder incontinence and nocturia.   Neurological: Negative for disturbances in coordination, loss of balance and seizures.   Psychiatric/Behavioral: Negative for depression. The patient does not have insomnia.    Allergic/Immunologic: Negative for hives and persistent infections.     PHYSICAL EXAM:  GEN: A&Ox3, WD WN NAD  HEENT: WNL  CHEST: CTAB, no W/R/R  HEART: RRR, no M/R/G  ABD: Soft, NT ND, BS x4 QUADS  MS; See Epic  NEURO: CN II-XII intact       The surgical consent was then reviewed with the patient, who agreed with all the contents of the consent form and it was signed. he was then given the [facility] surgery packet to bring with him to surgery for the anesthesia portion of his perioperative paperwork.   For all physicians except for Dr. Menjivar, we will email and possibly fax the consent forms and booking sheets to [facility] pre-admit.    The patient was given the  opportunity to ask questions about the surgical plan and consent form, and once no other questions were asked, I proceeded with the pre-op appointment.    PHYSICAL THERAPY:  He was also instructed regarding physical therapy and will begin on POD#1 at the [place] location.     POST OP CARE:instructions were reviewed including care of the wound and dressing after surgery and when he can shower.     CRUTCHES OR WALKER: It was explained to the patient that if they are having a lower extremity surgery that they will require either a walker or crutches to ambulate safely with after surgery. It was explained that a cane or other assistive devices are not sufficient to safely ambulate with after surgery. I explained to the patient that I will place an order for them to receive either crutches or a walker after surgery to go home with. It was explained that if they have crutches or a walker at home already, that they are REQUIRED to bring them to the hospital on the day of surgery. It was explained that if they do not have them at the hospital on the day of surgery that they WILL be provided a new pair or crutches or a walker to go home with to ensure ambulation will be safe if the patient needs to stop somewhere on the way home.      PAIN MANAGEMENT: Silas Stallings was also given their pain management regimen.    PAIN MEDICATION:  Norco 7.5/325mg 1 po q 4-6 hours prn pain  Ultram 50 mg Take 1-2 p.o. q.6 hours p.r.n. breakthrough pain,   Phenergan 25 mg one p.o. q.6 hours p.r.n. nausea and vomiting.    Post op meds to be delivered bedside prior to discharge. Deliver to family if patient is in surgery at 5pm.    The patient was told that narcotic pain medications may make them drowsy and instructions were given to not sign legal documents, drive or operate heavy machinery, cars, or equipment while under the influence of narcotic medications. The patient was told and understands that narcotic pain medications should only be  used as needed to control pain and that other options of pain control include TENs unit and ice packs/unit.     Patient was instructed to purchase and take Colace to counter possible GI side effects of taking opiates.     DVT prophylaxis was discussed with the patient today including risk factors for developing DVTs and history of DVTs. The patient was asked if any specific recommendations were given from the doctor/s that did pre-operative surgical clearance. The patient was then given an education sheet about DVTs and PE with warning signs and symptoms of both and steps to take if they suspect either of these.    Patient was asked if they were taking or using OCP pills or devices. If they answered yes, then they were instructed to stop using OCPs at this pre-operative appointment until 2 months post-op to help prevent DVT development. They understand that there are other forms of birth control that do not involve hormones. They expressed understanding that ignoring/not following this instruction could result in a DVT which could turn into a deadly pulmonary embolism.     This along with the Modified Caprini risk assessment model for VTE in general surgical patients was used to determine the patient's DVT risk.     From: Tez MK, eTnzin DA, Emilie SM, et al. Prevention of VTE in nonorthopedic surgical patients: antithrombotic therapy and prevention of thrombosis, 9th ed: American College of Chest Physicians evidence-based clinical practical guidelines. Chest 2012; 141:e227S. Copyright © 2012. Reproduced with permission from the American College of Chest Physicians.    The below listed DVT prophylaxis regimen was discussed along with SCDs during surgery and bilateral LAZARO compression stockings to be used post-op. Length of treatment has been determined to be 10-42 days post-op by the above noted Caprini assessment model. Early ambulation post-op was also discussed and emphasized with the patient.     Patient was  instructed to buy and take:  Aspirin 325mg QD x 3 weeks for DVT prophylaxis starting on the evening after surgery.  Patient will also use bilateral TEDs on lower extremities, SCDs during surgery, and early ambulation post-op. If the patient was previously taking 81mg baby aspirin, they were told to not take it will using the above stated aspirin and to restart the 81mg aspirin after completion of the aspirin dose.      Patient was also told to buy over the counter Prilosec medication and take it once daily for GI protection as long as they are taking NSAIDs or Aspirin.     Published data in Nitin OKEEFE, et al. J Arthroplasty. Oct; 31(10):2237-40, 2016; showed that aspirin use as prophylaxis during revision total joint arthroplasty was more effective than warfarin in preventing symptomatic venous thromboembolic events and was associated with lower complications.  Patients in the study were also treated with intermittent pneumatic compression devices. Compression stockings would be our method of mechanical prophylaxis, which has been shown to be similar to pneumatic compression in the systematic review, Parrish RJ, et al. Greta Surg. Feb; 239(2): 162-171, 2004.     Results showed a significantly higher incidence of symptomatic venous thromboembolic events among patients in the warfarin group vs. the aspirin group (1.75% vs. 0.56%). Researchers also noted a bleeding event rate of 1.5% among patients who received warfarin compared with a rate of 0.4% among patients who received aspirin.    Patient denies history of seizures.     I explained to following and the patient expressed understanding:  The patient is currently aware of the COVID19 pandemic and that proceeding with their surgical procedure could potentially increase exposure to coronavirus in the community. The patient understands that there is the possibility of delayed or cancelled appts or PT visits in the future. They understand that infection with the  coronavirus could complicate their surgery recovery. They are aware of the current policies and procedures of [facility] and the government regarding the pandemic and they were given the option of delaying my surgery. The patient elects to proceed with surgery at this time.     The patient was instructed to practice strict social distancing, hand washing/hygiene, respiratory hygiene, and cough etiquette from now until 6 weeks following surgery to reduce the risk of irwin coronavirus.    As there were no other questions to be asked, he was given my business card along with Ricky Menjivar MD business card if he has any questions or concerns prior to surgery or in the postop period.

## 2023-09-08 ENCOUNTER — TELEPHONE (OUTPATIENT)
Dept: SPORTS MEDICINE | Facility: CLINIC | Age: 48
End: 2023-09-08
Payer: COMMERCIAL

## 2023-09-08 ENCOUNTER — ANESTHESIA EVENT (OUTPATIENT)
Dept: SURGERY | Facility: HOSPITAL | Age: 48
End: 2023-09-08
Payer: COMMERCIAL

## 2023-09-08 ENCOUNTER — PATIENT MESSAGE (OUTPATIENT)
Dept: SPORTS MEDICINE | Facility: CLINIC | Age: 48
End: 2023-09-08
Payer: COMMERCIAL

## 2023-09-08 NOTE — TELEPHONE ENCOUNTER
Called to give patient his 5 am arrival time for surgery on Monday 9/11/23.    No answer/ VM full

## 2023-09-11 ENCOUNTER — ANESTHESIA (OUTPATIENT)
Dept: SURGERY | Facility: HOSPITAL | Age: 48
End: 2023-09-11
Payer: COMMERCIAL

## 2023-09-11 ENCOUNTER — HOSPITAL ENCOUNTER (OUTPATIENT)
Facility: HOSPITAL | Age: 48
Discharge: HOME | End: 2023-09-11
Attending: ORTHOPAEDIC SURGERY | Admitting: ORTHOPAEDIC SURGERY
Payer: COMMERCIAL

## 2023-09-11 VITALS
SYSTOLIC BLOOD PRESSURE: 151 MMHG | HEIGHT: 70 IN | OXYGEN SATURATION: 99 % | WEIGHT: 178 LBS | TEMPERATURE: 98 F | BODY MASS INDEX: 25.48 KG/M2 | RESPIRATION RATE: 17 BRPM | HEART RATE: 72 BPM | DIASTOLIC BLOOD PRESSURE: 87 MMHG

## 2023-09-11 DIAGNOSIS — M94.261 CHONDROMALACIA OF RIGHT KNEE: Primary | ICD-10-CM

## 2023-09-11 DIAGNOSIS — M23.203 OLD TEAR OF MEDIAL MENISCUS OF RIGHT KNEE, UNSPECIFIED TEAR TYPE: ICD-10-CM

## 2023-09-11 DIAGNOSIS — M23.200 OLD TEAR OF LATERAL MENISCUS OF RIGHT KNEE, UNSPECIFIED TEAR TYPE: ICD-10-CM

## 2023-09-11 PROBLEM — M23.41 LOOSE BODY OF RIGHT KNEE: Status: ACTIVE | Noted: 2023-09-11

## 2023-09-11 PROCEDURE — 25000003 PHARM REV CODE 250: Performed by: PHYSICIAN ASSISTANT

## 2023-09-11 PROCEDURE — 71000033 HC RECOVERY, INTIAL HOUR: Performed by: ORTHOPAEDIC SURGERY

## 2023-09-11 PROCEDURE — D9220A PRA ANESTHESIA: Mod: ANES,,, | Performed by: ANESTHESIOLOGY

## 2023-09-11 PROCEDURE — 94761 N-INVAS EAR/PLS OXIMETRY MLT: CPT

## 2023-09-11 PROCEDURE — 64999 RIGHT IPACK SINGLE SHOT: ICD-10-PCS | Mod: ,,, | Performed by: ANESTHESIOLOGY

## 2023-09-11 PROCEDURE — 63600175 PHARM REV CODE 636 W HCPCS: Performed by: NURSE ANESTHETIST, CERTIFIED REGISTERED

## 2023-09-11 PROCEDURE — 37000008 HC ANESTHESIA 1ST 15 MINUTES: Performed by: ORTHOPAEDIC SURGERY

## 2023-09-11 PROCEDURE — 64450 NJX AA&/STRD OTHER PN/BRANCH: CPT | Performed by: ANESTHESIOLOGY

## 2023-09-11 PROCEDURE — D9220A PRA ANESTHESIA: ICD-10-PCS | Mod: ANES,,, | Performed by: ANESTHESIOLOGY

## 2023-09-11 PROCEDURE — 36000710: Performed by: ORTHOPAEDIC SURGERY

## 2023-09-11 PROCEDURE — 37000009 HC ANESTHESIA EA ADD 15 MINS: Performed by: ORTHOPAEDIC SURGERY

## 2023-09-11 PROCEDURE — G0289 ARTHRO, LOOSE BODY + CHONDRO: HCPCS | Mod: 59,RT,, | Performed by: ORTHOPAEDIC SURGERY

## 2023-09-11 PROCEDURE — 36000711: Performed by: ORTHOPAEDIC SURGERY

## 2023-09-11 PROCEDURE — 25000003 PHARM REV CODE 250: Performed by: NURSE ANESTHETIST, CERTIFIED REGISTERED

## 2023-09-11 PROCEDURE — D9220A PRA ANESTHESIA: Mod: CRNA,,, | Performed by: NURSE ANESTHETIST, CERTIFIED REGISTERED

## 2023-09-11 PROCEDURE — 71000039 HC RECOVERY, EACH ADD'L HOUR: Performed by: ORTHOPAEDIC SURGERY

## 2023-09-11 PROCEDURE — 99900035 HC TECH TIME PER 15 MIN (STAT)

## 2023-09-11 PROCEDURE — 63600175 PHARM REV CODE 636 W HCPCS: Performed by: PHYSICIAN ASSISTANT

## 2023-09-11 PROCEDURE — 27201423 OPTIME MED/SURG SUP & DEVICES STERILE SUPPLY: Performed by: ORTHOPAEDIC SURGERY

## 2023-09-11 PROCEDURE — 64999 UNLISTED PX NERVOUS SYSTEM: CPT | Mod: ,,, | Performed by: ANESTHESIOLOGY

## 2023-09-11 PROCEDURE — D9220A PRA ANESTHESIA: ICD-10-PCS | Mod: CRNA,,, | Performed by: NURSE ANESTHETIST, CERTIFIED REGISTERED

## 2023-09-11 PROCEDURE — 64447 NJX AA&/STRD FEMORAL NRV IMG: CPT | Performed by: ANESTHESIOLOGY

## 2023-09-11 PROCEDURE — 63600175 PHARM REV CODE 636 W HCPCS: Performed by: ANESTHESIOLOGY

## 2023-09-11 PROCEDURE — 64447 RIGHT ADDUCTOR CANAL SINGLE SHOT: ICD-10-PCS | Mod: 59,RT,, | Performed by: ANESTHESIOLOGY

## 2023-09-11 PROCEDURE — 71000015 HC POSTOP RECOV 1ST HR: Performed by: ORTHOPAEDIC SURGERY

## 2023-09-11 PROCEDURE — G0289 PR ARTHRO, LOOSE BODY + CHONDRO: ICD-10-PCS | Mod: 59,RT,, | Performed by: ORTHOPAEDIC SURGERY

## 2023-09-11 PROCEDURE — 63600175 PHARM REV CODE 636 W HCPCS: Performed by: ORTHOPAEDIC SURGERY

## 2023-09-11 PROCEDURE — 25000003 PHARM REV CODE 250: Performed by: ANESTHESIOLOGY

## 2023-09-11 PROCEDURE — 29880 PR KNEE SCOPE MED/LAT MENISCECTOMY: ICD-10-PCS | Mod: 22,RT,, | Performed by: ORTHOPAEDIC SURGERY

## 2023-09-11 PROCEDURE — 29880 ARTHRS KNE SRG MNISECTMY M&L: CPT | Mod: 22,RT,, | Performed by: ORTHOPAEDIC SURGERY

## 2023-09-11 RX ORDER — EPINEPHRINE 1 MG/ML
INJECTION, SOLUTION, CONCENTRATE INTRAVENOUS
Status: DISCONTINUED | OUTPATIENT
Start: 2023-09-11 | End: 2023-09-11 | Stop reason: HOSPADM

## 2023-09-11 RX ORDER — FAMOTIDINE 10 MG/ML
INJECTION INTRAVENOUS
Status: DISCONTINUED | OUTPATIENT
Start: 2023-09-11 | End: 2023-09-11

## 2023-09-11 RX ORDER — ROPIVACAINE HYDROCHLORIDE 2 MG/ML
INJECTION, SOLUTION EPIDURAL; INFILTRATION; PERINEURAL CONTINUOUS
Status: ACTIVE | OUTPATIENT
Start: 2023-09-11

## 2023-09-11 RX ORDER — ACETAMINOPHEN 500 MG
1000 TABLET ORAL ONCE
Status: COMPLETED | OUTPATIENT
Start: 2023-09-11 | End: 2023-09-11

## 2023-09-11 RX ORDER — DEXAMETHASONE SODIUM PHOSPHATE 4 MG/ML
INJECTION, SOLUTION INTRA-ARTICULAR; INTRALESIONAL; INTRAMUSCULAR; INTRAVENOUS; SOFT TISSUE
Status: DISCONTINUED | OUTPATIENT
Start: 2023-09-11 | End: 2023-09-11

## 2023-09-11 RX ORDER — LORAZEPAM 2 MG/ML
0.25 INJECTION INTRAMUSCULAR ONCE AS NEEDED
Status: DISCONTINUED | OUTPATIENT
Start: 2023-09-11 | End: 2023-09-11 | Stop reason: HOSPADM

## 2023-09-11 RX ORDER — FENTANYL CITRATE 50 UG/ML
100 INJECTION, SOLUTION INTRAMUSCULAR; INTRAVENOUS
Status: DISPENSED | OUTPATIENT
Start: 2023-09-11

## 2023-09-11 RX ORDER — FENTANYL CITRATE 50 UG/ML
25 INJECTION, SOLUTION INTRAMUSCULAR; INTRAVENOUS
Status: COMPLETED | OUTPATIENT
Start: 2023-09-11 | End: 2023-09-11

## 2023-09-11 RX ORDER — MIDAZOLAM HYDROCHLORIDE 1 MG/ML
1 INJECTION INTRAMUSCULAR; INTRAVENOUS
Status: DISPENSED | OUTPATIENT
Start: 2023-09-11

## 2023-09-11 RX ORDER — LIDOCAINE HYDROCHLORIDE 20 MG/ML
INJECTION INTRAVENOUS
Status: DISCONTINUED | OUTPATIENT
Start: 2023-09-11 | End: 2023-09-11

## 2023-09-11 RX ORDER — SODIUM CHLORIDE 9 MG/ML
INJECTION, SOLUTION INTRAVENOUS CONTINUOUS
Status: DISCONTINUED | OUTPATIENT
Start: 2023-09-11 | End: 2023-09-11 | Stop reason: HOSPADM

## 2023-09-11 RX ORDER — DIPHENHYDRAMINE HYDROCHLORIDE 50 MG/ML
25 INJECTION INTRAMUSCULAR; INTRAVENOUS EVERY 6 HOURS PRN
Status: DISCONTINUED | OUTPATIENT
Start: 2023-09-11 | End: 2023-09-11 | Stop reason: HOSPADM

## 2023-09-11 RX ORDER — PROPOFOL 10 MG/ML
VIAL (ML) INTRAVENOUS
Status: DISCONTINUED | OUTPATIENT
Start: 2023-09-11 | End: 2023-09-11

## 2023-09-11 RX ORDER — KETAMINE HYDROCHLORIDE 10 MG/ML
INJECTION, SOLUTION INTRAMUSCULAR; INTRAVENOUS
Status: DISCONTINUED | OUTPATIENT
Start: 2023-09-11 | End: 2023-09-11

## 2023-09-11 RX ORDER — CELECOXIB 200 MG/1
400 CAPSULE ORAL ONCE
Status: COMPLETED | OUTPATIENT
Start: 2023-09-11 | End: 2023-09-11

## 2023-09-11 RX ORDER — ONDANSETRON 2 MG/ML
INJECTION INTRAMUSCULAR; INTRAVENOUS
Status: DISCONTINUED | OUTPATIENT
Start: 2023-09-11 | End: 2023-09-11

## 2023-09-11 RX ADMIN — PROPOFOL 50 MG: 10 INJECTION, EMULSION INTRAVENOUS at 08:09

## 2023-09-11 RX ADMIN — KETAMINE HYDROCHLORIDE 15 MG: 10 INJECTION INTRAMUSCULAR; INTRAVENOUS at 07:09

## 2023-09-11 RX ADMIN — CEFAZOLIN 2 G: 2 INJECTION, POWDER, FOR SOLUTION INTRAMUSCULAR; INTRAVENOUS at 07:09

## 2023-09-11 RX ADMIN — ONDANSETRON 4 MG: 2 INJECTION INTRAMUSCULAR; INTRAVENOUS at 07:09

## 2023-09-11 RX ADMIN — SODIUM CHLORIDE 250 ML: 9 INJECTION, SOLUTION INTRAVENOUS at 09:09

## 2023-09-11 RX ADMIN — DEXAMETHASONE SODIUM PHOSPHATE 8 MG: 4 INJECTION, SOLUTION INTRAMUSCULAR; INTRAVENOUS at 07:09

## 2023-09-11 RX ADMIN — FENTANYL CITRATE 25 MCG: 50 INJECTION, SOLUTION INTRAMUSCULAR; INTRAVENOUS at 09:09

## 2023-09-11 RX ADMIN — CELECOXIB 400 MG: 200 CAPSULE ORAL at 06:09

## 2023-09-11 RX ADMIN — PROPOFOL 200 MG: 10 INJECTION, EMULSION INTRAVENOUS at 07:09

## 2023-09-11 RX ADMIN — LIDOCAINE HYDROCHLORIDE 50 MG: 20 INJECTION INTRAVENOUS at 08:09

## 2023-09-11 RX ADMIN — FENTANYL CITRATE 100 MCG: 50 INJECTION INTRAMUSCULAR; INTRAVENOUS at 06:09

## 2023-09-11 RX ADMIN — SODIUM CHLORIDE: 0.9 INJECTION, SOLUTION INTRAVENOUS at 06:09

## 2023-09-11 RX ADMIN — SODIUM CHLORIDE, SODIUM GLUCONATE, SODIUM ACETATE, POTASSIUM CHLORIDE, MAGNESIUM CHLORIDE, SODIUM PHOSPHATE, DIBASIC, AND POTASSIUM PHOSPHATE: .53; .5; .37; .037; .03; .012; .00082 INJECTION, SOLUTION INTRAVENOUS at 07:09

## 2023-09-11 RX ADMIN — FAMOTIDINE 20 MG: 10 INJECTION, SOLUTION INTRAVENOUS at 06:09

## 2023-09-11 RX ADMIN — KETAMINE HYDROCHLORIDE 10 MG: 10 INJECTION INTRAMUSCULAR; INTRAVENOUS at 06:09

## 2023-09-11 RX ADMIN — ACETAMINOPHEN 1000 MG: 500 TABLET ORAL at 06:09

## 2023-09-11 RX ADMIN — MIDAZOLAM 2 MG: 1 INJECTION INTRAMUSCULAR; INTRAVENOUS at 06:09

## 2023-09-11 RX ADMIN — LIDOCAINE HYDROCHLORIDE 50 MG: 20 INJECTION INTRAVENOUS at 07:09

## 2023-09-11 NOTE — ANESTHESIA PROCEDURE NOTES
Right adductor canal single shot    Patient location during procedure: pre-op   Block not for primary anesthetic.  Reason for block: at surgeon's request and post-op pain management   Post-op Pain Location: Right knee pain   Start time: 9/11/2023 6:30 AM  Timeout: 9/11/2023 6:30 AM   End time: 9/11/2023 6:35 AM    Staffing  Authorizing Provider: Bryn Jansen MD  Performing Provider: Bryn Jansen MD    Staffing  Performed by: Bryn Jansen MD  Authorized by: Bryn Jansen MD    Preanesthetic Checklist  Completed: patient identified, IV checked, site marked, risks and benefits discussed, surgical consent, monitors and equipment checked, pre-op evaluation and timeout performed  Peripheral Block  Patient position: supine  Prep: ChloraPrep  Patient monitoring: heart rate, cardiac monitor, continuous pulse ox, continuous capnometry and frequent blood pressure checks  Block type: adductor canal  Laterality: right  Injection technique: single shot  Needle  Needle type: Stimuplex   Needle gauge: 21 G  Needle length: 4 in  Needle localization: anatomical landmarks and ultrasound guidance   -ultrasound image captured on disc.  Assessment  Injection assessment: negative aspiration, negative parasthesia and local visualized surrounding nerve  Paresthesia pain: none  Heart rate change: no  Slow fractionated injection: yes  Pain Tolerance: comfortable throughout block and no complaints      Additional Notes  VSS.  [facility] RN monitoring vitals throughout procedure.  Patient tolerated procedure well.     20ml 0.25% ropivacaine with epi

## 2023-09-11 NOTE — OPERATIVE NOTE ADDENDUM
Certification of Assistant at Surgery       Surgery Date: 9/11/2023     Participating Surgeons:  Surgeon(s) and Role:     * Ricky Menjivar MD - Primary    Procedures:  Procedure(s) (LRB):  ARTHROSCOPY, KNEE, WITH PARTIAL MEADIAL AND PARTIAL LATERAL MENISCECTOMY (Right)  ARTHROSCOPY, KNEE, WITH CHONDROPLASTY (Right)  REMOVAL, LOOSE BODY,  ARTHROSCOPIC (Right)  ARTHROSCOPY, KNEE, WITH EXTENSIVE DEBRIDEMENT (Right)    Assistant Surgeon's Certification of Necessity:  I understand that section 1842 (b) (6) (d) of the Social Security Act generally prohibits Medicare Part B reasonable charge payment for the services of assistants at surgery in teaching hospitals when qualified residents are available to furnish such services. I certify that the services for which payment is claimed were medically necessary, and that no qualified resident was available to perform the services. I further understand that these services are subject to post-payment review by the Medicare carrier.      Anshul Arteaga PA-C    09/11/2023  8:31 AM

## 2023-09-11 NOTE — ANESTHESIA PREPROCEDURE EVALUATION
09/11/2023  Silas Stallings is a 48 y.o., male.      Pre-op Assessment    I have reviewed the Patient Summary Reports.     I have reviewed the Nursing Notes. I have reviewed the NPO Status.   I have reviewed the Medications.     Review of Systems  Anesthesia Hx:  No problems with previous Anesthesia  History of prior surgery of interest to airway management or planning: Previous anesthesia: General Denies Family Hx of Anesthesia complications.   Denies Personal Hx of Anesthesia complications.   Social:  Smoker    Hematology/Oncology:  Hematology Normal   Oncology Normal     EENT/Dental:EENT/Dental Normal   Cardiovascular:  Cardiovascular Normal Exercise tolerance: good     Pulmonary:  Pulmonary Normal    Renal/:  Renal/ Normal     Hepatic/GI:  Hepatic/GI Normal    Musculoskeletal:  Musculoskeletal Normal    Neurological:  Neurology Normal    Endocrine:  Endocrine Normal    Dermatological:  Skin Normal    Psych:  Psychiatric Normal           Physical Exam  General: Well nourished, Cooperative, Alert and Oriented    Airway:  Mallampati: III / II  Mouth Opening: Normal  TM Distance: Normal  Tongue: Normal  Neck ROM: Normal ROM    Dental:  Intact    Chest/Lungs:  Clear to auscultation, Normal Respiratory Rate    Heart:  Rate: Normal  Rhythm: Regular Rhythm  Sounds: Normal    Abdomen:  Normal, Soft, Nontender        Anesthesia Plan  Type of Anesthesia, risks & benefits discussed:    Anesthesia Type: Gen Supraglottic Airway, Gen ETT  Intra-op Monitoring Plan: Standard ASA Monitors  Post Op Pain Control Plan: multimodal analgesia and peripheral nerve block  Induction:  IV  Airway Plan: Direct, Post-Induction  Informed Consent: Informed consent signed with the Patient and all parties understand the risks and agree with anesthesia plan.  All questions answered.   ASA Score: 2    Ready For Surgery From Anesthesia  Perspective.     .

## 2023-09-11 NOTE — ANESTHESIA POSTPROCEDURE EVALUATION
Anesthesia Post Evaluation    Patient: Silas Stallings    Procedure(s) Performed: Procedure(s) (LRB):  ARTHROSCOPY, KNEE, WITH PARTIAL MEDIAL AND PARTIAL LATERAL MENISCECTOMY (Right)  ARTHROSCOPY, KNEE, WITH CHONDROPLASTY (Right)  REMOVAL, LOOSE BODY,  ARTHROSCOPIC (Right)  ARTHROSCOPY, KNEE, WITH EXTENSIVE DEBRIDEMENT (Right)    Final Anesthesia Type: general      Patient location during evaluation: PACU  Patient participation: Yes- Able to Participate  Level of consciousness: awake and alert and oriented  Post-procedure vital signs: reviewed and stable  Pain management: adequate  Airway patency: patent    PONV status at discharge: No PONV  Anesthetic complications: no      Cardiovascular status: hemodynamically stable  Respiratory status: unassisted, spontaneous ventilation and room air  Hydration status: euvolemic  Follow-up not needed.          Vitals Value Taken Time   /87 09/11/23 0948   Temp 36.6 °C (97.9 °F) 09/11/23 0848   Pulse 74 09/11/23 0948   Resp 51 09/11/23 0948   SpO2 100 % 09/11/23 0948   Vitals shown include unvalidated device data.      Event Time   Out of Recovery 09:44:49         Pain/Caleb Score: Pain Rating Prior to Med Admin: 7 (9/11/2023  9:36 AM)  Pain Rating Post Med Admin: 5 (9/11/2023  9:36 AM)  Caleb Score: 10 (9/11/2023  9:28 AM)

## 2023-09-11 NOTE — ANESTHESIA PROCEDURE NOTES
Intubation    Date/Time: 9/11/2023 7:06 AM    Performed by: Greta Hyatt CRNA  Authorized by: Bryn Jansen MD    Intubation:     Induction:  Intravenous    Intubated:  Postinduction    Mask Ventilation:  Easy mask    Attempts:  1    Attempted By:  CRNA    Difficult Airway Encountered?: No      Complications:  None    Airway Device:  Supraglottic airway/LMA    Airway Device Size:  4.0    Style/Cuff Inflation:  Cuffed    Inflation Amount (mL):  5    Secured at:  The lips    Placement Verified By:  Capnometry    Complicating Factors:  None    Findings Post-Intubation:  BS equal bilateral

## 2023-09-11 NOTE — PLAN OF CARE
Pre op checklist complete. Pt resting in bed with call light in reach. All questions answered. Pt belongings at bedside and plan for family to hold. Pt mother brought to bedside. Pt still needs site reinaldo/anes consent/update. Pt need crutches.

## 2023-09-11 NOTE — PLAN OF CARE
VSS.  Patient tolerating oral liquids without difficulty.   No apparent s&s of distress noted at this time, no complaints voiced at this time.   Discharge instructions reviewed with patient and mother with good verbal feedback received.   Post op medications delivered to bedside, DME bandage and ice wrap intact.  Patient ready for discharge.

## 2023-09-11 NOTE — ANESTHESIA PROCEDURE NOTES
Right IPACK single shot    Patient location during procedure: pre-op   Block not for primary anesthetic.  Reason for block: at surgeon's request and post-op pain management   Post-op Pain Location: Right knee pain   Start time: 9/11/2023 6:30 AM  Timeout: 9/11/2023 6:30 AM   End time: 9/11/2023 6:35 AM    Staffing  Authorizing Provider: Bryn Jansen MD  Performing Provider: Bryn Jansen MD    Staffing  Performed by: Bryn Jansen MD  Authorized by: Bryn Jansen MD    Preanesthetic Checklist  Completed: patient identified, IV checked, site marked, risks and benefits discussed, surgical consent, monitors and equipment checked, pre-op evaluation and timeout performed  Peripheral Block  Patient position: supine  Prep: ChloraPrep  Patient monitoring: heart rate, cardiac monitor, continuous pulse ox, continuous capnometry and frequent blood pressure checks  Block type: I PACK  Laterality: right  Injection technique: single shot  Needle  Needle type: Stimuplex   Needle gauge: 21 G  Needle length: 4 in  Needle localization: anatomical landmarks and ultrasound guidance   -ultrasound image captured on disc.  Assessment  Injection assessment: negative aspiration, negative parasthesia and local visualized surrounding nerve  Paresthesia pain: none  Heart rate change: no  Slow fractionated injection: yes  Pain Tolerance: comfortable throughout block and no complaints      Additional Notes  VSS.  [facility] RN monitoring vitals throughout procedure.  Patient tolerated procedure well.     20ml 0.25% ropivacaine with epi, clonidine, decadron

## 2023-09-11 NOTE — TRANSFER OF CARE
"Anesthesia Transfer of Care Note    Patient: Silas Stallings    Procedure(s) Performed: Procedure(s) (LRB):  ARTHROSCOPY, KNEE, WITH PARTIAL MEADIAL AND PARTIAL LATERAL MENISCECTOMY (Right)  ARTHROSCOPY, KNEE, WITH CHONDROPLASTY (Right)  REMOVAL, LOOSE BODY,  ARTHROSCOPIC (Right)  ARTHROSCOPY, KNEE, WITH EXTENSIVE DEBRIDEMENT (Right)    Patient location: PACU    Anesthesia Type: general    Transport from OR: Transported from OR on 100% O2 by closed face mask with adequate spontaneous ventilation    Post pain: adequate analgesia    Post assessment: no apparent anesthetic complications and tolerated procedure well    Post vital signs: stable    Level of consciousness: awake and responds to stimulation    Nausea/Vomiting: no nausea/vomiting    Complications: none    Transfer of care protocol was followed      Last vitals:   Visit Vitals  /72 (BP Location: Right arm, Patient Position: Lying)   Pulse (!) 59   Temp 36.6 °C (97.9 °F) (Oral)   Resp 10   Ht 5' 10" (1.778 m)   Wt 80.7 kg (178 lb)   SpO2 100%   BMI 25.54 kg/m²     "

## 2023-09-11 NOTE — BRIEF OP NOTE
Operative Note       Surgery Date: 9/11/2023     Surgeon(s) and Role:     * Ricky Menjivar MD - Primary    Pre-op Diagnosis:  Old tear of medial meniscus of right knee, unspecified tear type [M23.203]  Old tear of lateral meniscus of right knee, unspecified tear type [M23.200]  Chondromalacia of right knee [M94.261]  Loose body of right knee [M23.41]    Post-op Diagnosis:   Old tear of medial meniscus of right knee, unspecified tear type [M23.203]  Old tear of lateral meniscus of right knee, unspecified tear type [M23.200]  Chondromalacia of right knee [M94.261]  Loose body of right knee [M23.41]    Procedure(s) (LRB):  ARTHROSCOPY, KNEE, WITH PARTIAL MEADIAL AND PARTIAL LATERAL MENISCECTOMY (Right)  ARTHROSCOPY, KNEE, WITH CHONDROPLASTY (Right)  REMOVAL, LOOSE BODY,  ARTHROSCOPIC (Right)  ARTHROSCOPY, KNEE, WITH EXTENSIVE DEBRIDEMENT (Right)    Anesthesia: General    Findings/Key Components:  See op note    Core Measure Documentation:  Were antibiotics extended? No  Was the patient administered a VTE Prophylaxis? No. Short procedure; low risk  Estimated Blood Loss: minimal           Specimens (From admission, onward)      None          Implants: * No implants in log *    Complications: none           Disposition: PACU - hemodynamically stable.           Condition: Stable    Attestation:  I was present for the entire procedure.

## 2023-09-12 ENCOUNTER — CLINICAL SUPPORT (OUTPATIENT)
Dept: REHABILITATION | Facility: HOSPITAL | Age: 48
End: 2023-09-12
Payer: COMMERCIAL

## 2023-09-12 DIAGNOSIS — R26.9 GAIT DISTURBANCE: ICD-10-CM

## 2023-09-12 DIAGNOSIS — M25.661 DECREASED RANGE OF MOTION (ROM) OF RIGHT KNEE: ICD-10-CM

## 2023-09-12 DIAGNOSIS — M23.203 OLD TEAR OF MEDIAL MENISCUS OF RIGHT KNEE, UNSPECIFIED TEAR TYPE: ICD-10-CM

## 2023-09-12 DIAGNOSIS — M23.200 OLD TEAR OF LATERAL MENISCUS OF RIGHT KNEE, UNSPECIFIED TEAR TYPE: ICD-10-CM

## 2023-09-12 DIAGNOSIS — R29.898 WEAKNESS OF RIGHT LOWER EXTREMITY: ICD-10-CM

## 2023-09-12 DIAGNOSIS — M94.261 CHONDROMALACIA OF RIGHT KNEE: ICD-10-CM

## 2023-09-12 PROBLEM — Z98.890 STATUS POST MEDIAL MENISCECTOMY OF RIGHT KNEE: Status: ACTIVE | Noted: 2023-09-12

## 2023-09-12 PROBLEM — Z98.890 S/P LATERAL MENISCECTOMY OF RIGHT KNEE: Status: ACTIVE | Noted: 2023-09-12

## 2023-09-12 PROCEDURE — 97112 NEUROMUSCULAR REEDUCATION: CPT

## 2023-09-12 PROCEDURE — 97161 PT EVAL LOW COMPLEX 20 MIN: CPT

## 2023-09-12 PROCEDURE — 97110 THERAPEUTIC EXERCISES: CPT

## 2023-09-12 NOTE — PLAN OF CARE
[facility]   Physical Therapy Initial Evaluation      Name: Silas Stallings  Clinic Number: [id number]    Therapy Diagnosis:   Encounter Diagnoses   Name Primary?    Old tear of medial meniscus of right knee, unspecified tear type     Old tear of lateral meniscus of right knee, unspecified tear type     Chondromalacia of right knee     Decreased range of motion (ROM) of right knee     Weakness of right lower extremity     Gait disturbance         Physician: Anshul Arteaga PA-C    Physician Orders: PT Eval and Treatment  Medical Diagnosis from Referral: Old tear of medial meniscus of the right knee (M23.203), old tear of lateral meniscus of right knee (M23.200), chondromalacia of right knee (94.261)    Evaluation Date: 9/12/2023  Authorization Period Expiration: 9/6/24  Plan of Care Expiration: 12/31/23  Date of Surgery: 9/11/23  Visit # / Visits authorized: 1/1  FOTO: 1/ 3    Precautions: Standard     Time In: 14:00  Time Out: 15:00  Total Billable Time: 60 minutes    Procedures:  Procedure(s) (LRB):  ARTHROSCOPY, KNEE, WITH PARTIAL MEADIAL AND PARTIAL LATERAL MENISCECTOMY (Right)  ARTHROSCOPY, KNEE, WITH CHONDROPLASTY (Right)  REMOVAL, LOOSE BODY,  ARTHROSCOPIC (Right)  ARTHROSCOPY, KNEE, WITH EXTENSIVE DEBRIDEMENT (Right)    Subjective     History of current condition - Iván reports: chronic right knee pain prior to the surgical intervention. He states he underwent an ACL surgery while in college as his knee has been lacking terminal knee extension since procedure. He currently reports 0/10 pain and states he experiences mild sharp pain at times following the surgical intervention. The patient does not have any stairs at home and has been using his crutches as instructed. 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 He would like to participate in physical therapy to improve the  strength/motion of his knee so he may return to work and exercising.     Imaging: MRI/X-Ray    EXAMINATION:  MRI KNEE WITHOUT CONTRAST RIGHT     CLINICAL HISTORY:  Knee pain, chronic, positive xray (Age >= 5y);Pain in right knee     TECHNIQUE:  Multiplanar, multisequence images were performed about the RIGHT knee.     COMPARISON:  Radiograph 08/03/2023.  No prior MRIs available for comparison.     FINDINGS:  **MENISCI:     Medial meniscus: Truncation of the free edge medial meniscus predominantly body segment consistent with chronic tear versus meniscectomy.     Lateral meniscus: Truncation free edge body lateral meniscus consistent with meniscectomy, partial versus chronic tear.     Meniscal root attachment: Intact     Popliteal hiatus, superior and inferior meniscal fascicles:Intact and visualized.     **LIGAMENTS:     Anterior cruciate ligament: S/P ACL repair with marked attenuation of ACL graft, likely chronically torn with scar     Posterior cruciate ligament: Continuous, with normal signal.     Medial collateral ligament: Intact.     Lateral collateral ligament and  biceps femoris: Intact.     Iliotibial band (ITB): No evidence for ITB syndrome.     Popliteus tendon and popliteofibular ligament: Intact.     Posterior medial and posterior lateral corners: Intact.     **TENDONS:     Quadriceps tendon: Intact.     Patella tendon: Postoperative changes     Joint fluid: Physiologic.     Hoffa's fat pad: Scope scar     Intact medial retinaculum/ MPFL.     Intact lateral retinaculum.     **CARTILAGE:     Patellofemoral:Significant degenerative changes with marginal osteophytosis, and diffuse cartilaginous irregularity     Medial tibiofemoral: Cartilaginous irregularity/fissuring with central osteophytic spurring as well as marginal osteophytes..     Lateral tibiofemoral: Cartilaginous irregularity/fissuring with marginal osteophytes and lateral notch osteophytes..     **OTHER:     Bone marrow: No fracture or marrow  replacing process.     Miscellaneous: The gastrocnemius muscles are normal.No evident plica thickening.1.6 cm body low signal intensity on all imaging sequences corresponding with plain film examination of 08/03/2023, posterior joint space level.  Ossicle adjacent to the anterolateral tibial margin.     Impression:     Advanced tricompartmental osteoarthritis with associated abnormality of free edge predominantly body and horn of medial and lateral meniscus loose body posterior joint space with ossicle anterolateral tibial margin     Attenuation/attritional change ACL graft.    Prior Therapy: None for this condition   Social History: Fiance and two sons   Occupation: Employee at [employer]  Prior Level of Function: Independent and unrestricted  Current Level of Function: Modified independent     Pain:  Current 0/10, worst NR/10, best 0/10   Location: right knee    Description: Dull, Aching, Sharp at times  Aggravating Factors: Walking  Easing Factors: Ice    Patients goals: Return to prior level of function so he may return to work     Medical History:     Surgical History:   Silas Stallings  has a past surgical history that includes Anterior cruciate ligament repair (Right); Facial fracture surgery; Knee arthroscopy w/ meniscectomy (Right, 9/11/2023); Arthroscopic chondroplasty of knee joint (Right, 9/11/2023); Arthroscopic removal of loose body from joint (Right, 9/11/2023); and Knee arthroscopy w/ debridement (Right, 9/11/2023).    Medications:   Silas has a current medication list which includes the following prescription(s): aspirin, hydrocodone-acetaminophen, meloxicam, mupirocin, promethazine, and tramadol, and the following Facility-Administered Medications: fentanyl, midazolam, and ropivacaine (pf) 2 mg/ml (0.2%).    Allergies:   Review of patient's allergies indicates:  No Known Allergies     Objective      Observation: Pt in no obvious distress     Gait: Ambulates WBAT, is TTWB at times while  "using bilateral crutches      Knee Active Range of Motion:    Right  Left    Flexion 90 126   Extension -5 +3 HE      Knee Passive Range of Motion:    Right  Left    Flexion    Extension NT +5 HE         Ankle Active Range of Motion: WNL        Quad Set: Patient displays mild compensation by engaging his right hip flexor musculature        Joint Mobility: NT due to surgical intervention         Palpation: TTP along the anterior knee and medial/lateral joint line         Sensation: Intact to light touch        Pulses: Dorsalis pedis A strong and regular         Strength: Not performed today due to post-op status.       Intake Outcome Measure for FOTO Knee Survey    Therapist reviewed FOTO scores for Silas Stallings on 9/12/2023.   FOTO report - see Media section or FOTO account episode details.    Intake Score: See media section          Treatment     Total Treatment time (time-based codes) separate from Evaluation: 24 minutes     Iván received the treatments listed below:      therapeutic exercises to develop strength, endurance, and ROM for 8 minutes including:  - Quad sets (3 x 10, 5" hold)    manual therapy techniques: Joint mobilizations and Soft tissue Mobilization were applied to the: right knee for 00 minutes, including:      neuromuscular re-education activities to improve: Balance, Coordination, Kinesthetic, Sense, Muscle Activation and Proprioception for 16 minutes. The following activities were included:    - Knee extension low load prolonged passive stretch x 5 minutes  - Heel pop with strap (3 x 10, 5" hold)   - Ankle pumps x 25    therapeutic activities to improve functional performance for 00 minutes, including:      gait training to improve functional mobility and safety for 00 minutes, including:        Patient Education and Home Exercises     Education provided:   - Patient was educated upon his home exercise program, activity modification, and signs/symptoms of infection and DVT. "     Written Home Exercises Provided: Exercises were reviewed and Iván was able to demonstrate them prior to the end of the session. Iván demonstrated good  understanding of the education provided. See EMR under Patient Instructions for exercises provided during therapy sessions.    Assessment     Silas is a 48 y.o. male referred to outpatient Physical Therapy with a medical diagnosis of Old tear of medial meniscus of right knee, unspecified tear type [M23.203], Old tear of lateral meniscus of right knee, unspecified tear type [M23.200], Chondromalacia of right knee [M94.261], and Loose body of right knee [M23.41]. Patient presents with limited right knee range of motion, strength deficits, swelling of the right knee, and altered gait mechanics. He will benefit from a course of physical therapy to address listed deficits.     Patient prognosis is good.   Patient will benefit from skilled outpatient Physical Therapy to address the deficits stated above and in the chart below, provide patient /family education, and to maximize patientt's level of independence.     Plan of care discussed with patient: Yes  Patient's spiritual, cultural and educational needs considered and patient is agreeable to the plan of care and goals as stated below:     Anticipated Barriers for therapy: N/A     Medical Necessity is demonstrated by the following  History  Co-morbidities and personal factors that may impact the plan of care [] LOW: no personal factors / co-morbidities  [x] MODERATE: 1-2 personal factors / co-morbidities  [] HIGH: 3+ personal factors / co-morbidities    Moderate / High Support Documentation:   Co-morbidities affecting plan of care:     Personal Factors:   Prior ACL surgical repair     Examination  Body Structures and Functions, activity limitations and participation restrictions that may impact the plan of care [x] LOW: addressing 1-2 elements  [] MODERATE: 3+ elements  [] HIGH: 4+ elements (please support  below)    Moderate / High Support Documentation:      Clinical Presentation [x] LOW: stable  [] MODERATE: Evolving  [] HIGH: Unstable     Decision Making/ Complexity Score: low       Goals:  Short Term Goals: 0-6 weeks   Patient to demonstrate isolated quad contraction in 2 weeks.   Patient to ambulate with a normal gait pattern in 2 weeks.   Patient to improve active knee extension to 0 degrees in 3 weeks.   Patient to be able to perform 25 SLR without an extensor lag in 4 weeks.  Patient to improve knee flexion to uninvolved side in 4-6 weeks.     Long Term Goals: 6-12 weeks   Patient to return to community ambulation without right knee pain in 6 weeks.   Patient to navigate step/curbs without right knee pain in 6-8 weeks.   Patient to be able to perform all job duties in 6-8 weeks.   Patient to return to exercise routine in 8-12 weeks without modification.   Plan     Plan of care Certification: 9/12/2023 to 12/31/23.    Outpatient Physical Therapy 2 times weekly for 12 weeks to include the following interventions: Electrical Stimulation IFC/TENS/NMES, Gait Training, Manual Therapy, Moist Heat/ Ice, Neuromuscular Re-ed, Patient Education, Self Care, Therapeutic Activities, Therapeutic Exercise, and Dry Needling.     Renzo Hernandez, PT, DPT  Board Certified in Sports Physical Therapy  Lit Cazares, PT, DPT

## 2023-09-13 NOTE — OP NOTE
[facility])  Surgery Department  Operative Note    SUMMARY     Date of Procedure: 9/11/2023     Procedure: Procedure(s) (LRB):  ARTHROSCOPY, KNEE, WITH PARTIAL MEDIAL AND PARTIAL LATERAL MENISCECTOMY (Right)  ARTHROSCOPY, KNEE, WITH CHONDROPLASTY (Right)  REMOVAL, LOOSE BODY,  ARTHROSCOPIC (Right)  ARTHROSCOPY, KNEE, WITH EXTENSIVE DEBRIDEMENT (Right)     Surgeon(s) and Role:     * Ricky Menjivar MD - Primary    Assisting Surgeon:   MD Vickey Zuniga     Pre-Operative Diagnosis: Old tear of medial meniscus of right knee, unspecified tear type [M23.203]  Old tear of lateral meniscus of right knee, unspecified tear type [M23.200]  Chondromalacia of right knee [M94.261]  Loose body of right knee [M23.41]    Post-Operative Diagnosis: Post-Op Diagnosis Codes:     * Old tear of medial meniscus of right knee, unspecified tear type [M23.203]     * Old tear of lateral meniscus of right knee, unspecified tear type [M23.200]     * Chondromalacia of right knee [M94.261]     * Loose body of right knee [M23.41]    Anesthesia: General    Technical Procedures Used:     DATE OF PROCEDURE:  9/11/2023      PREOPERATIVE DIAGNOSES:   1. Right knee chondromalacia  2. Right knee medial meniscus tear.   3. Right knee lateral meniscus tear  4. Right knee loose bodies  5. Extensive scar tissue, right knee     POSTOPERATIVE DIAGNOSES:   1. Right knee chondromalacia  2. Right knee medial meniscus tear.   3. Right knee lateral meniscus tear  4. Right knee loose bodies  5. Extensive scar tissue, right knee (complex)    PROCEDURES:   1. Right knee arthroscopic chondroplasty  2. Right knee arthroscopic partial medial and lateral meniscectomies  3. Right knee arthroscopic loose body removal  4. Extensive debridement right knee    SURGEON: Ricky Menjivar M.D.      ** Due to the extensive amount of scar tissue present, imbedded notch osteophytes adjacent to the ACL graft, twice the normal amount of time  was used for meticulous removal of scar tissue and embedded osteophytes adjacent to the ACL graft to avoid additional impingement and abrasion on the ACL graft.    ASSISTANT:   MD Vickey Zuniga      COMPLICATIONS: None.      POSITION: Supine with arthroscopic leg simpson.      ANESTHESIA: General endotracheal plus block.      COMPLICATIONS: None.      TOURNIQUET TIME:  None     EBL:  Minimal     EXAMINATION UNDER ANESTHESIA:   Knee: full range of motion, no evidence of instability.      ARTHROSCOPIC FINDINGS:   1. Complex tear posterior horn / body medial meniscus.   2. Complex posterior horn tear, lateral meniscus  3. Chondromalacia, patella, trochlea, MFC grade 2/3 extensive  4. Large loose bodies, intercondylar notch  5. Intact ACL graft  6. Extensive scar tissue intercondylar notch with notch osteophytes     INDICATIONS: The patient is a 48 year-old male with a history of right knee pain years after ACL surgery.  He has progressed his chondral wear and has mechanical catching and locking sensation in his knee and wishes to stay active. MRI confirms a tearing in his meniscus tissue and possible large loose bodies in his knee.  After the risks and benefits of surgery were discussed with the patient, including bleeding, infection, scarring, persistent pain, risk of blood clots (DVT), pulmonary embolism, compartment syndrome, damage to cartilage, damage to neurovascular structures, plus the risks of anesthesia, including, death, stroke, and heart attack, the patient wished to proceed with operative intervention.        DESCRIPTION OF PROCEDURE:      The patient and correct limb were identified and marked in the pre-op holding area.      The patient was brought in the room. After undergoing general endotracheal anesthesia, he was placed on a well-padded operating table. His right lower extremity was prepped and draped in usual sterile fashion. A nonsterile tourniquet was placed high up around the  thigh. A well padded arthroscopic leg simpson was used during the case. The contralateral leg was placed in a well padded Esdras stirrup with bony prominences all being well padded.       The standard inferolateral and inferomedial portals were created. Diagnostic arthroscopy performed.      CHONDROPLASTY: The patellofemoral joint was entered. There was significant chondromalacia on the trochlea and on the undersurface of the patella. Using a full radius shaver and biter, unstable cartilage flaps were trimmed down to a stable rim.     There was a mild synovitis noted within the anterior interval. An VAPR device was used to remove areas of irritating synovium from the overlying trochlea in the anterior interval to allow for visualization to minimize abrasion.     LOOSE BODY REMOVAL:  Multiple large loose bodies were identified in the suprapatellar pouch and anterior interval.  Largest was 1.5 x 1 cm.  Using a grasper through a separate portal, this was removed and discarded as it was loose cartilage flap.  Smaller loose bodies and debris were removed with oscillating christianne.     CHONDROPLASTY:  Attention turned to the medial compartment. Medial femoral condyle had grade 2/3 extensive changes along the mid flexion portion.  Using a full radius shaver and biter, unstable cartilage flaps were trimmed down to a stable rim.      There was a complex tear in the body and posterior horn of the medial meniscus, Using combination of straight and curved biters and arthroscopic christianne, the unstable portion of the medial meniscus was trimmed down to a stable rim. Approximately 25% of the body of the medial meniscus was resected down to get this to a stable position.      EXTENSIVE DEBRIDEMENT:  Attention was turned to the intercondylar notch. The ACL graft was intact and stable with mild fraying.  The PCL was intact.  There was extensive scar tissue with embedded osteophytes in the intercondylar notch, adjacent to and abutting  the ACL graft.  After taking the knee through a full range of motion, the notch osteophytes and imbedded scar tissue was judged to be irritating and abrading the ACL graft.  The decision was made to dislodge and remove the significant scar tissue using a combination of straight and curved biters, arthroscopic Bart and an arthroscopic bur.  The large notch osteophytes were carefully removed to not destabilize the ACL graft but to avoid further impingement through range of motion.  This was meticulously done and significantly improved graft clearance in the intercondylar notch was achieved.  The knee had improved extension and minimal additional impingement against the scar tissue or lateral notch osteophyte.  Scar tissue was also removed from the anterior interval with combination of Mitek vapor device and oscillating shaver to avoid any further impingement.    Attention was turned to the lateral compartment. The lateral meniscus had a tear along the central aspect.  Using a biter and shaver, the central 20% was trimmed out. The lateral femoral condyle and lateral tibial plateau were intact.       Portal sites were closed with 4-0 Monocryl, covered with Mastisol, Steri-Strips, Xeroform, 4 x 4 fluffs, ABD pads and thigh-high LAZARO hose.     The patient was extubated in the room, transferred to Recovery Room in stable condition accompanied by her physician.  There were no complications in the case.      I was present, scrubbed and did perform all critical portions of the case.        RAD SURESH MD        Description of the Findings of the Procedure: above    Significant Surgical Tasks Conducted by the Assistant(s), if Applicable: none    Complications: No    Estimated Blood Loss (EBL): * No values recorded between 9/11/2023  7:32 AM and 9/11/2023  8:29 AM *           Implants: * No implants in log *    Specimens:   Specimen (24h ago, onward)      None                    Condition: Good    Disposition: PACU -  hemodynamically stable.    Attestation: I was present and scrubbed for the entire procedure.    Discharge Note    SUMMARY     Admit Date: 9/11/2023    Discharge Date and Time: 9/11/2023 10:10 AM    Hospital Course (synopsis of major diagnoses, care, treatment, and services provided during the course of the hospital stay): outpatient surgery     Final Diagnosis: Post-Op Diagnosis Codes:     * Old tear of medial meniscus of right knee, unspecified tear type [M23.203]     * Old tear of lateral meniscus of right knee, unspecified tear type [M23.200]     * Chondromalacia of right knee [M94.261]     * Loose body of right knee [M23.41]    Disposition: Home or Self Care    Follow Up/Patient Instructions:     Medications:  Reconciled Home Medications:      Medication List        CONTINUE taking these medications      aspirin 325 MG tablet  Take 1 tablet (325 mg total) by mouth once daily. for 21 days     HYDROcodone-acetaminophen 7.5-325 mg per tablet  Commonly known as: NORCO  Take 1 tablet by mouth every 6 (six) hours as needed for Pain.     meloxicam 15 MG tablet  Commonly known as: MOBIC  Take 1 tablet (15 mg total) by mouth once daily.     promethazine 25 MG tablet  Commonly known as: PHENERGAN  Take 1 tablet (25 mg total) by mouth every 6 (six) hours as needed for Nausea.     traMADoL 50 mg tablet  Commonly known as: ULTRAM  Take 1 tablet (50 mg total) by mouth every 6 (six) hours as needed for Pain.            ASK your doctor about these medications      mupirocin 2 % ointment  Commonly known as: BACTROBAN  Apply topically 3 (three) times daily.            Discharge Procedure Orders   Diet Adult Regular     Keep surgical extremity elevated     Ice to affected area     Notify your health care provider if you experience any of the following:  temperature >100.4     Notify your health care provider if you experience any of the following:  persistent nausea and vomiting or diarrhea     Notify your health care provider if  you experience any of the following:  severe uncontrolled pain     Notify your health care provider if you experience any of the following:  redness, tenderness, or signs of infection (pain, swelling, redness, odor or green/yellow discharge around incision site)     Notify your health care provider if you experience any of the following:  difficulty breathing or increased cough     Notify your health care provider if you experience any of the following:  severe persistent headache     Notify your health care provider if you experience any of the following:  worsening rash      Remove dressing in 72 hours     Shower on day dressing removed (No bath)     Weight bearing restrictions (specify):   Order Comments: Weight bearing as tolerated

## 2023-09-14 ENCOUNTER — CLINICAL SUPPORT (OUTPATIENT)
Dept: REHABILITATION | Facility: HOSPITAL | Age: 48
End: 2023-09-14
Payer: COMMERCIAL

## 2023-09-14 DIAGNOSIS — M94.261 CHONDROMALACIA OF RIGHT KNEE: ICD-10-CM

## 2023-09-14 DIAGNOSIS — M23.200 OLD TEAR OF LATERAL MENISCUS OF RIGHT KNEE, UNSPECIFIED TEAR TYPE: ICD-10-CM

## 2023-09-14 DIAGNOSIS — R29.898 WEAKNESS OF RIGHT LOWER EXTREMITY: ICD-10-CM

## 2023-09-14 DIAGNOSIS — M23.203 OLD TEAR OF MEDIAL MENISCUS OF RIGHT KNEE, UNSPECIFIED TEAR TYPE: ICD-10-CM

## 2023-09-14 DIAGNOSIS — R26.9 GAIT DISTURBANCE: ICD-10-CM

## 2023-09-14 DIAGNOSIS — M25.661 DECREASED RANGE OF MOTION (ROM) OF RIGHT KNEE: Primary | ICD-10-CM

## 2023-09-14 PROCEDURE — 97140 MANUAL THERAPY 1/> REGIONS: CPT

## 2023-09-14 PROCEDURE — 97112 NEUROMUSCULAR REEDUCATION: CPT

## 2023-09-14 PROCEDURE — 97116 GAIT TRAINING THERAPY: CPT

## 2023-09-14 PROCEDURE — 97110 THERAPEUTIC EXERCISES: CPT

## 2023-09-14 NOTE — PROGRESS NOTES
[facility]   Physical Therapy Treatment Note      Name: [facility] Number: [id number]    Therapy Diagnosis:   Encounter Diagnoses   Name Primary?    Decreased range of motion (ROM) of right knee Yes    Weakness of right lower extremity     Gait disturbance     Old tear of medial meniscus of right knee, unspecified tear type     Old tear of lateral meniscus of right knee, unspecified tear type     Chondromalacia of right knee      Physician: Anshul Arteaga PA-C    Visit Date: 9/14/2023    Physician Orders: PT Eval and Treatment  Medical Diagnosis from Referral: Old tear of medial meniscus of the right knee (M23.203), old tear of lateral meniscus of right knee (M23.200), chondromalacia of right knee (94.261)    Evaluation Date: 9/12/2023  Authorization Period Expiration: 9/6/24  Plan of Care Expiration: 12/31/23  Date of Surgery: 9/11/23  Visit # / Visits authorized: 1/20 + Eval  FOTO: 1/ 3    PTA Visit #: 0/5     Time In: 0900  Time Out: 1001  Total Billable Time: 54 minutes    Subjective     Pt reports: He did his HEP diligently yesterday. The knee is feeling pretty good. He has mild to mod pain that seems to be well controlled with ice and medication.     He was compliant with home exercise program.      Pain: Not verbalized /10  Location: right knee      Objective      Objective Measures updated at progress report unless specified.     Treatment     Iván received the treatments listed below:      therapeutic exercises to develop strength, endurance, ROM, and flexibility for 15 minutes including:  Heel prop x2 5# weights x10 mins  Heel slide 2x20     manual therapy techniques: were applied for 15 minutes, including:  Re-assessment  Posterior Tibial glides Grd III  Accessory tibial ER/IR mobs grd II-III  R knee PROM   Hoffa's fat pad and patella mobilizations   Dressing removal    neuromuscular re-education activities to improve: Balance, Coordination, Kinesthetic,  "Sense, Proprioception, and Motor Control for 22 minutes. The following activities were included:  Neuromuscular stimulation to quadriceps with quad set 10":20" on:off x10 mins plus set up    S/L SLR 3x8     therapeutic activities to improve functional performance for 00  minutes, including:      gait training to improve functional mobility and safety for 9  minutes, including:  Gait training with x2 AC's with step through pattern and cues for quad activation/timing and avoiding walking on a flexed knee.       Patient Education and Home Exercises       Education provided:   - Reviewed/updated HEP frequency and volume  - Heel prop x10 mins 10x/day  - 20 quad sets 10x/day     Written Home Exercises Provided: yes. Exercises were reviewed and Iván was able to demonstrate them prior to the end of the session.  Iván demonstrated good  understanding of the education provided. See EMR under Patient Instructions for exercises provided during therapy sessions    Assessment     Iván presents with extension limited to 8 degrees of flexion at onset of treatment which improved to 3 degrees flexion after manual and LLLD mobilization. Pt instructed to increase volume and frequency as noted in patient education section for heel propping and quad sets. Pt also instructed in self-patella and fat pad mobilizations to be performed as part of HEP which were demo'd appropriately in clinic.     Iván Is progressing well towards his goals.   Pt prognosis is Good.     Pt will continue to benefit from skilled outpatient physical therapy to address the deficits listed in the problem list box on initial evaluation, provide pt/family education and to maximize pt's level of independence in the home and community environment.     Pt's spiritual, cultural and educational needs considered and pt agreeable to plan of care and goals.     Anticipated barriers to physical therapy: None    Goals:     Short Term Goals: 0-6 weeks   Patient to demonstrate " isolated quad contraction in 2 weeks.   Patient to ambulate with a normal gait pattern in 2 weeks.   Patient to improve active knee extension to 0 degrees in 3 weeks.   Patient to be able to perform 25 SLR without an extensor lag in 4 weeks.  Patient to improve knee flexion to uninvolved side in 4-6 weeks.      Long Term Goals: 6-12 weeks   Patient to return to community ambulation without right knee pain in 6 weeks.   Patient to navigate step/curbs without right knee pain in 6-8 weeks.   Patient to be able to perform all job duties in 6-8 weeks.   Patient to return to exercise routine in 8-12 weeks without modification.     Plan     Outpatient Physical Therapy 2 times weekly for 12 weeks to include the following interventions: Electrical Stimulation IFC/TENS/NMES, Gait Training, Manual Therapy, Moist Heat/ Ice, Neuromuscular Re-ed, Patient Education, Self Care, Therapeutic Activities, Therapeutic Exercise, and Dry Needling.     Renzo Hernandez, PT , DPT  Board Certified in Sports Physical Therapy

## 2023-09-19 ENCOUNTER — CLINICAL SUPPORT (OUTPATIENT)
Dept: REHABILITATION | Facility: HOSPITAL | Age: 48
End: 2023-09-19
Payer: COMMERCIAL

## 2023-09-19 DIAGNOSIS — R29.898 WEAKNESS OF RIGHT LOWER EXTREMITY: ICD-10-CM

## 2023-09-19 DIAGNOSIS — M94.261 CHONDROMALACIA OF RIGHT KNEE: ICD-10-CM

## 2023-09-19 DIAGNOSIS — M25.661 DECREASED RANGE OF MOTION (ROM) OF RIGHT KNEE: Primary | ICD-10-CM

## 2023-09-19 DIAGNOSIS — R26.9 GAIT DISTURBANCE: ICD-10-CM

## 2023-09-19 DIAGNOSIS — M23.203 OLD TEAR OF MEDIAL MENISCUS OF RIGHT KNEE, UNSPECIFIED TEAR TYPE: ICD-10-CM

## 2023-09-19 DIAGNOSIS — M23.200 OLD TEAR OF LATERAL MENISCUS OF RIGHT KNEE, UNSPECIFIED TEAR TYPE: ICD-10-CM

## 2023-09-19 PROCEDURE — 97110 THERAPEUTIC EXERCISES: CPT

## 2023-09-19 PROCEDURE — 97140 MANUAL THERAPY 1/> REGIONS: CPT

## 2023-09-19 PROCEDURE — 97112 NEUROMUSCULAR REEDUCATION: CPT

## 2023-09-21 ENCOUNTER — CLINICAL SUPPORT (OUTPATIENT)
Dept: REHABILITATION | Facility: HOSPITAL | Age: 48
End: 2023-09-21
Payer: COMMERCIAL

## 2023-09-21 DIAGNOSIS — R26.9 GAIT DISTURBANCE: ICD-10-CM

## 2023-09-21 DIAGNOSIS — M25.661 DECREASED RANGE OF MOTION (ROM) OF RIGHT KNEE: Primary | ICD-10-CM

## 2023-09-21 DIAGNOSIS — M23.203 OLD TEAR OF MEDIAL MENISCUS OF RIGHT KNEE, UNSPECIFIED TEAR TYPE: ICD-10-CM

## 2023-09-21 DIAGNOSIS — M23.200 OLD TEAR OF LATERAL MENISCUS OF RIGHT KNEE, UNSPECIFIED TEAR TYPE: ICD-10-CM

## 2023-09-21 DIAGNOSIS — R29.898 WEAKNESS OF RIGHT LOWER EXTREMITY: ICD-10-CM

## 2023-09-21 DIAGNOSIS — M94.261 CHONDROMALACIA OF RIGHT KNEE: ICD-10-CM

## 2023-09-21 PROCEDURE — 97140 MANUAL THERAPY 1/> REGIONS: CPT

## 2023-09-21 PROCEDURE — 97110 THERAPEUTIC EXERCISES: CPT

## 2023-09-21 PROCEDURE — 97112 NEUROMUSCULAR REEDUCATION: CPT

## 2023-09-21 NOTE — PROGRESS NOTES
[facility]   Physical Therapy Treatment Note      Name: [facility] Number: [id number]    Therapy Diagnosis:   Encounter Diagnoses   Name Primary?    Decreased range of motion (ROM) of right knee Yes    Weakness of right lower extremity     Gait disturbance     Old tear of medial meniscus of right knee, unspecified tear type     Old tear of lateral meniscus of right knee, unspecified tear type     Chondromalacia of right knee        Physician: Anshul Arteaga PA-C    Visit Date: 9/19/2023    Physician Orders: PT Eval and Treatment  Medical Diagnosis from Referral: Old tear of medial meniscus of the right knee (M23.203), old tear of lateral meniscus of right knee (M23.200), chondromalacia of right knee (94.261)    Evaluation Date: 9/12/2023  Authorization Period Expiration: 9/6/24  Plan of Care Expiration: 12/31/23  Date of Surgery: 9/11/23  Visit # / Visits authorized: 2/20 + Eval  FOTO: 1/ 3    PTA Visit #: 0/5     Time In: 10:00  Time Out: 11:01  Total Billable Time: 61 minutes    Subjective     Pt reports: Patient reports he went to [place] for his daughter's competition. He states his right knee is feeling stiff and reports mild soreness when standing on his feet for an extended period of time.     He was compliant with home exercise program.      Pain: 4/10  Location: right knee      Objective      Objective Measures updated at progress report unless specified.     Treatment     Iván received the treatments listed below:      therapeutic exercises to develop strength, endurance, ROM, and flexibility for 17 minutes including:  Heel prop x2 5# weights x10 mins  Heel slide 3x15     manual therapy techniques: were applied for 15 minutes, including:  Re-assessment  Posterior Tibial glides Grd III  R knee PROM   Hoffa's fat pad and patella mobilizations     NP  Accessory tibial ER/IR mobs grd II-III    neuromuscular re-education activities to improve: Balance, Coordination,  "Kinesthetic, Sense, Proprioception, and Motor Control for 28 minutes. The following activities were included:    Neuromuscular stimulation to quadriceps with quad set 10":20" on:off x10 mins plus set up  SLR/SL SLR 3 x 10  TKE with blue TB (3 x 10)     therapeutic activities to improve functional performance for 00  minutes, including:      gait training to improve functional mobility and safety for 00 minutes, including:    NP  Gait training with x2 AC's with step through pattern and cues for quad activation/timing and avoiding walking on a flexed knee.       Patient Education and Home Exercises       Education provided:   - Reviewed/updated HEP frequency and volume  - Heel prop x10 mins 10x/day  - 20 quad sets 10x/day     Written Home Exercises Provided: yes. Exercises were reviewed and Iván was able to demonstrate them prior to the end of the session.  Iván demonstrated good  understanding of the education provided. See EMR under Patient Instructions for exercises provided during therapy sessions    Assessment     The patient's knee extension remains limited as manual interventions such as a superior patella glide and passive knee extension with overpressure was continued. Low load long duration stretch was performed for 10 minutes to promote knee extension. The patient's quad strength is improving, yet mild extensor lag persists therefore patient to continue with side lying straight leg raises. As patient's quad strength continues to improve, seek to wean patient down to 1 crutch when ambulating.       Iván Is progressing well towards his goals.   Pt prognosis is Good.     Pt will continue to benefit from skilled outpatient physical therapy to address the deficits listed in the problem list box on initial evaluation, provide pt/family education and to maximize pt's level of independence in the home and community environment.     Pt's spiritual, cultural and educational needs considered and pt agreeable to " plan of care and goals.     Anticipated barriers to physical therapy: None    Goals:     Short Term Goals: 0-6 weeks   Patient to demonstrate isolated quad contraction in 2 weeks.   Patient to ambulate with a normal gait pattern in 2 weeks.   Patient to improve active knee extension to 0 degrees in 3 weeks.   Patient to be able to perform 25 SLR without an extensor lag in 4 weeks.  Patient to improve knee flexion to uninvolved side in 4-6 weeks.      Long Term Goals: 6-12 weeks   Patient to return to community ambulation without right knee pain in 6 weeks.   Patient to navigate step/curbs without right knee pain in 6-8 weeks.   Patient to be able to perform all job duties in 6-8 weeks.   Patient to return to exercise routine in 8-12 weeks without modification.     Plan     Outpatient Physical Therapy 2 times weekly for 12 weeks to include the following interventions: Electrical Stimulation IFC/TENS/NMES, Gait Training, Manual Therapy, Moist Heat/ Ice, Neuromuscular Re-ed, Patient Education, Self Care, Therapeutic Activities, Therapeutic Exercise, and Dry Needling.     Lit Cazares, PT , DPT

## 2023-09-21 NOTE — PROGRESS NOTES
[facility]   Physical Therapy Treatment Note      Name: Silas [facility] Number: [id number]    Therapy Diagnosis:   Encounter Diagnoses   Name Primary?    Decreased range of motion (ROM) of right knee Yes    Weakness of right lower extremity     Gait disturbance     Old tear of medial meniscus of right knee, unspecified tear type     Old tear of lateral meniscus of right knee, unspecified tear type     Chondromalacia of right knee      Physician: Anshul Arteaga PA-C    Visit Date: 9/21/2023    Physician Orders: PT Eval and Treatment  Medical Diagnosis from Referral: Old tear of medial meniscus of the right knee (M23.203), old tear of lateral meniscus of right knee (M23.200), chondromalacia of right knee (94.261)    Evaluation Date: 9/12/2023  Authorization Period Expiration: 9/6/24  Plan of Care Expiration: 12/31/23  Date of Surgery: 9/11/23  Visit # / Visits authorized: 1/20 + Eval  FOTO: 1/ 3    PTA Visit #: 0/5     DATE OF PROCEDURE:  9/11/2023     PROCEDURES:   1. Right knee arthroscopic chondroplasty  2. Right knee arthroscopic partial medial and lateral meniscectomies  3. Right knee arthroscopic loose body removal  4. Extensive debridement right knee    Time In: 0900  Time Out: 1015  Total Billable Time: 71 minutes    Subjective     Pt reports: He did his HEP diligently yesterday. The knee is feeling pretty good. He has mild to mod pain that seems to be well controlled with ice and medication.     He was compliant with home exercise program.      Pain: Not verbalized /10  Location: right knee      Objective      Objective Measures updated at progress report unless specified.     R Knee PROM:  Pre-Tx: 0-  Post-Tx: 0-5-115    Treatment     Iván received the treatments listed below:      therapeutic exercises to develop strength, endurance, ROM, and flexibility for 27 minutes including:  Heel prop x2 5# weights x10 mins at beginning and end of tx  Heel slide 2x20  "    manual therapy techniques: were applied for 19 minutes, including:  Re-assessment  Posterior Tibial glides Grd III  Accessory tibial ER/IR mobs grd II-III  R knee PROM   Hoffa's fat pad and patella mobilizations       neuromuscular re-education activities to improve: Balance, Coordination, Kinesthetic, Sense, Proprioception, and Motor Control for 25 minutes. The following activities were included:  Hinge QS with IFC c10 mins 10" work:rest   S/L SLR 3x8     therapeutic activities to improve functional performance for 00 minutes, including:        Patient Education and Home Exercises       Education provided:   - Reviewed/updated HEP frequency and volume  - Heel prop x10 mins 10x/day  - 20 quad sets 10x/day     Written Home Exercises Provided: yes. Exercises were reviewed and Iván was able to demonstrate them prior to the end of the session.  Iván demonstrated good  understanding of the education provided. See EMR under Patient Instructions for exercises provided during therapy sessions    Assessment     Iván presents with extension limited to 12 degrees flexion at onset of tx which improved to 5 deg flexion. It will be challenging to regain full motion as he has not had it since his previous ACL surgery. I rounded with Dr. Menjivar's SMA, Vickey Turner and discussed that he may be appropriate for a dynasplint to achieve a LLLD extension mobilization throughout the day. I believe he is appropriate and would benefit. I continued to educate the patient on appropriate volume and frequency for HEP as he continues to demo poor carry over of extension ROM from previous tx.     Iván Is progressing well towards his goals.   Pt prognosis is Good.     Pt will continue to benefit from skilled outpatient physical therapy to address the deficits listed in the problem list box on initial evaluation, provide pt/family education and to maximize pt's level of independence in the home and community environment.     Pt's " spiritual, cultural and educational needs considered and pt agreeable to plan of care and goals.     Anticipated barriers to physical therapy: None    Goals:     Short Term Goals: 0-6 weeks   Patient to demonstrate isolated quad contraction in 2 weeks.   Patient to ambulate with a normal gait pattern in 2 weeks.   Patient to improve active knee extension to 0 degrees in 3 weeks.   Patient to be able to perform 25 SLR without an extensor lag in 4 weeks.  Patient to improve knee flexion to uninvolved side in 4-6 weeks.      Long Term Goals: 6-12 weeks   Patient to return to community ambulation without right knee pain in 6 weeks.   Patient to navigate step/curbs without right knee pain in 6-8 weeks.   Patient to be able to perform all job duties in 6-8 weeks.   Patient to return to exercise routine in 8-12 weeks without modification.     Plan     Outpatient Physical Therapy 2 times weekly for 12 weeks to include the following interventions: Electrical Stimulation IFC/TENS/NMES, Gait Training, Manual Therapy, Moist Heat/ Ice, Neuromuscular Re-ed, Patient Education, Self Care, Therapeutic Activities, Therapeutic Exercise, and Dry Needling.     Renzo Hernandez, PT , DPT  Board Certified in Sports Physical Therapy

## 2023-09-22 ENCOUNTER — TELEPHONE (OUTPATIENT)
Dept: SPORTS MEDICINE | Facility: CLINIC | Age: 48
End: 2023-09-22
Payer: COMMERCIAL

## 2023-09-22 DIAGNOSIS — Z98.890 S/P LATERAL MENISCECTOMY OF RIGHT KNEE: Primary | ICD-10-CM

## 2023-09-22 DIAGNOSIS — Z98.890 S/P MEDIAL MENISCECTOMY OF RIGHT KNEE: ICD-10-CM

## 2023-09-26 ENCOUNTER — CLINICAL SUPPORT (OUTPATIENT)
Dept: REHABILITATION | Facility: HOSPITAL | Age: 48
End: 2023-09-26
Payer: COMMERCIAL

## 2023-09-26 ENCOUNTER — OFFICE VISIT (OUTPATIENT)
Dept: SPORTS MEDICINE | Facility: CLINIC | Age: 48
End: 2023-09-26
Payer: COMMERCIAL

## 2023-09-26 VITALS
SYSTOLIC BLOOD PRESSURE: 129 MMHG | HEART RATE: 73 BPM | DIASTOLIC BLOOD PRESSURE: 84 MMHG | WEIGHT: 178 LBS | BODY MASS INDEX: 25.48 KG/M2 | HEIGHT: 70 IN

## 2023-09-26 DIAGNOSIS — R29.898 WEAKNESS OF RIGHT LOWER EXTREMITY: ICD-10-CM

## 2023-09-26 DIAGNOSIS — Z98.890 S/P ARTHROSCOPIC SURGERY OF RIGHT KNEE: Primary | ICD-10-CM

## 2023-09-26 DIAGNOSIS — Z09 SURGERY FOLLOW-UP EXAMINATION: ICD-10-CM

## 2023-09-26 DIAGNOSIS — M25.661 DECREASED RANGE OF MOTION (ROM) OF RIGHT KNEE: Primary | ICD-10-CM

## 2023-09-26 DIAGNOSIS — M23.200 OLD TEAR OF LATERAL MENISCUS OF RIGHT KNEE, UNSPECIFIED TEAR TYPE: ICD-10-CM

## 2023-09-26 DIAGNOSIS — M23.203 OLD TEAR OF MEDIAL MENISCUS OF RIGHT KNEE, UNSPECIFIED TEAR TYPE: ICD-10-CM

## 2023-09-26 DIAGNOSIS — R26.9 GAIT DISTURBANCE: ICD-10-CM

## 2023-09-26 DIAGNOSIS — T14.8XXA SKIN WOUND FROM SURGICAL INCISION: ICD-10-CM

## 2023-09-26 DIAGNOSIS — M94.261 CHONDROMALACIA OF RIGHT KNEE: ICD-10-CM

## 2023-09-26 PROCEDURE — 99999 PR PBB SHADOW E&M-EST. PATIENT-LVL III: CPT | Mod: PBBFAC,,, | Performed by: PHYSICIAN ASSISTANT

## 2023-09-26 PROCEDURE — 97140 MANUAL THERAPY 1/> REGIONS: CPT

## 2023-09-26 PROCEDURE — 97110 THERAPEUTIC EXERCISES: CPT

## 2023-09-26 PROCEDURE — 99024 POSTOP FOLLOW-UP VISIT: CPT | Mod: S$GLB,,, | Performed by: PHYSICIAN ASSISTANT

## 2023-09-26 PROCEDURE — 97112 NEUROMUSCULAR REEDUCATION: CPT

## 2023-09-26 PROCEDURE — 99024 PR POST-OP FOLLOW-UP VISIT: ICD-10-PCS | Mod: S$GLB,,, | Performed by: PHYSICIAN ASSISTANT

## 2023-09-26 PROCEDURE — 99999 PR PBB SHADOW E&M-EST. PATIENT-LVL III: ICD-10-PCS | Mod: PBBFAC,,, | Performed by: PHYSICIAN ASSISTANT

## 2023-09-26 RX ORDER — SULFAMETHOXAZOLE AND TRIMETHOPRIM 800; 160 MG/1; MG/1
1 TABLET ORAL 2 TIMES DAILY
Qty: 14 TABLET | Refills: 0 | Status: SHIPPED | OUTPATIENT
Start: 2023-09-26

## 2023-09-26 NOTE — PROGRESS NOTES
"CC: Right knee scope post op 2 weeks    Patient is here for his 2 week post op appointment s/p below and is doing well. Patient is doing PT at [facility] and is progressing as expected. Patient is taking pain medication at night to help with discomfort while trying to sleep. he denies any chest pain, SOB, fevers, chills, nausea, vomiting, or drainage from incision sites.  Recently started use of Ana Paula splint to help obtain full extension which seems to be his biggest barrier and physical therapy at this time.  He continues to ambulate with the assistance of crutches, but is trying to wean off these.    DATE OF PROCEDURE:  9/11/2023      PREOPERATIVE DIAGNOSES:   1. Right knee chondromalacia  2. Right knee medial meniscus tear.   3. Right knee lateral meniscus tear  4. Right knee loose bodies  5. Extensive scar tissue, right knee     POSTOPERATIVE DIAGNOSES:   1. Right knee chondromalacia  2. Right knee medial meniscus tear.   3. Right knee lateral meniscus tear  4. Right knee loose bodies  5. Extensive scar tissue, right knee (complex)     PROCEDURES:   1. Right knee arthroscopic chondroplasty  2. Right knee arthroscopic partial medial and lateral meniscectomies  3. Right knee arthroscopic loose body removal  4. Extensive debridement right knee     SURGEON: Ricky Menjivar M.D.     PE:    /84   Pulse 73   Ht 5' 10" (1.778 m)   Wt 80.7 kg (178 lb)   BMI 25.54 kg/m²      Right knee:    Medial arthroscopy portal well healed.  Lateral arthroscopy portal with mild erythema and damp the appearance with a foul odor.  No appreciated warmth, fluctuance, or expressible discharge.  Mild swelling  Compartments soft  Neurovascular status intact in extremity    AROM 5 to 80 degrees  Decreased quad strength  1+ effusion    Assessment:  2 weeks s/p right knee arthroscopic chondroplasty, partial medial lateral meniscectomies, loose body removal, and extensive debridement    Plan:  1.  Removed steri strips.  Advised " to continue to cover the lateral portal incision while showering, but allow to air out during the day.  I will send in a prescription for Bactrim DS to take twice daily.  He will send us a message in the portal next week to let us know how this is healing.    2.  15 cc of normal joint fluid aspirated from the right knee.  See procedure note for details.    3.  Physical therapy for quad, ROM, modalities.  HEP for ROM, knee, VMO and hip abductor strengthening.  Continue use of Ana Paula splint.    4.  Pain level acceptable for first post op visit.  Wean off pain medicine by next visit if still taking    5. Return to clinic in 4 weeks at 6 weeks post-op.      All questions were answered. Instructed patient to call with questions or concerns in the interim.

## 2023-09-26 NOTE — PROCEDURES
Large Joint Aspiration/Injection: R knee    Date/Time: 9/26/2023 11:00 AM    Performed by: Anshul Arteaga PA-C  Authorized by: Anshul Arteaga PA-C    Consent Done?:  Yes (Verbal)  Indications:  Joint swelling  Site marked: the procedure site was marked    Timeout: prior to procedure the correct patient, procedure, and site was verified    Prep: patient was prepped and draped in usual sterile fashion      Local anesthesia used?: Yes    Local anesthetic:  Lidocaine 2% without epinephrine  Anesthetic total (ml):  10      Details:  Needle Size:  18 G  Ultrasonic Guidance for needle placement?: No    Approach:  Superior  Location:  Knee  Site:  R knee  Aspirate amount (mL):  15  Aspirate:  Clear and serous  Patient tolerance:  Patient tolerated the procedure well with no immediate complications    Aspiration Procedure  A time out was performed, including verification of patient ID, procedure, site and side, availability of information and equipment, review of safety issues, and agreement with consent, the procedure site was marked.    After time out was performed, the patient was prepped aseptically with povidone-iodine swabsticks. Approximately 10 cc of 1% lidocaine plain was injected with a 25-gauge needle into the aspiration site without difficulty.  15cc's of normal joint fluid were aspirated from the Superolateral  aspect of the right Knee Joint using an 18g x 1.5 needle in sterile fashion without complication. Bandage was applied.     Silas Stallings was reminded to rest with RICE for 48 hours post injection and to call the clinic immediately for any adverse side effects as explained in clinic today.

## 2023-09-26 NOTE — LETTER
Patient: Silas Stallings   YOB: 1975   Clinic Number: [id number]   Today's Date: September 26, 2023        Certificate to Return to Work     Silas George was seen by Anshul Arteaga PA-C on 9/26/2023.    To Whom It May Concern:      Silas George can not return to work for at least four weeks.        If you have any questions or concerns, please feel free to contact the office at [phone].    Thank you.    Anshul Arteaga PA-C                                     Signature: __________________________________________________

## 2023-09-27 NOTE — PROGRESS NOTES
[facility]   Physical Therapy Treatment Note      Name: Silas [facility] Number: [id number]    Therapy Diagnosis:   Encounter Diagnoses   Name Primary?    Decreased range of motion (ROM) of right knee Yes    Weakness of right lower extremity     Gait disturbance     Old tear of medial meniscus of right knee, unspecified tear type     Old tear of lateral meniscus of right knee, unspecified tear type     Chondromalacia of right knee      Physician: Anshul Arteaga PA-C    Visit Date: 9/26/2023    Physician Orders: PT Eval and Treatment  Medical Diagnosis from Referral: Old tear of medial meniscus of the right knee (M23.203), old tear of lateral meniscus of right knee (M23.200), chondromalacia of right knee (94.261)    Evaluation Date: 9/12/2023  Authorization Period Expiration: 9/6/24  Plan of Care Expiration: 12/31/23  Date of Surgery: 9/11/23  Visit # / Visits authorized: 4/20 + Eval  FOTO: 1/ 3    PTA Visit #: 0/5     DATE OF PROCEDURE:  9/11/2023     PROCEDURES:   1. Right knee arthroscopic chondroplasty  2. Right knee arthroscopic partial medial and lateral meniscectomies  3. Right knee arthroscopic loose body removal  4. Extensive debridement right knee    Time In: 10:00 AM   Time Out: 10:59 AM   Total Billable Time: 59 minutes    Subjective     Pt reports: he has been working on his knee extension at home with minimal improvements. He states bending his knee also remains limited, yet he is aware gaining knee extension is the first priority. Patient states he is continuing to use the crutches outside of his home.      He was compliant with home exercise program.  Pain: Not verbalized /10  Location: right knee      Objective      Objective Measures updated at progress report unless specified.     R Knee PROM from 9/21:  Pre-Tx: 0-  Post-Tx: 0-5-115    Treatment     Iván received the treatments listed below:      therapeutic exercises to develop strength, endurance,  "ROM, and flexibility for 17 minutes including:  Heel prop x2 5# weights x10 mins at beginning of treatment  Heel slide 30 x with 3" hold     manual therapy techniques: were applied for 16 minutes, including:  Posterior Tibial glides Grd III  R knee PROM   Hoffa's fat pad and patella mobilizations       neuromuscular re-education activities to improve: Balance, Coordination, Kinesthetic, Sense, Proprioception, and Motor Control for 26 minutes. The following activities were included:  Quad sets with NMES support  for 10' with a 10" work to rest ration   S/L SLR 3 x 10   Standing TKE with red TB 3 x 12     therapeutic activities to improve functional performance for 00 minutes, including:        Patient Education and Home Exercises       Education provided:   - Reviewed/updated HEP frequency and volume  - Heel prop x10 mins 10x/day  - 20 quad sets 10x/day     Written Home Exercises Provided: yes. Exercises were reviewed and Iván was able to demonstrate them prior to the end of the session.  Iván demonstrated good  understanding of the education provided. See EMR under Patient Instructions for exercises provided during therapy sessions    Assessment     Prior to treatment, the patient was fitted for a dynasplint. The patient's right knee range of motion remains limited. Patient was able to achieve extension to 4-5 degrees of flexion following a heel prop for 10 minutes and manual interventions including superior patella mobilizations and passive knee extension. Quad strengthening continued as NMES support was provided. The patient's tolerance to weight bearing activities remains limited by right knee discomfort.     Iván Is progressing well towards his goals.   Pt prognosis is Good.     Pt will continue to benefit from skilled outpatient physical therapy to address the deficits listed in the problem list box on initial evaluation, provide pt/family education and to maximize pt's level of independence in the home and " community environment.     Pt's spiritual, cultural and educational needs considered and pt agreeable to plan of care and goals.     Anticipated barriers to physical therapy: None    Goals:     Short Term Goals: 0-6 weeks   Patient to demonstrate isolated quad contraction in 2 weeks.   Patient to ambulate with a normal gait pattern in 2 weeks.   Patient to improve active knee extension to 0 degrees in 3 weeks.   Patient to be able to perform 25 SLR without an extensor lag in 4 weeks.  Patient to improve knee flexion to uninvolved side in 4-6 weeks.      Long Term Goals: 6-12 weeks   Patient to return to community ambulation without right knee pain in 6 weeks.   Patient to navigate step/curbs without right knee pain in 6-8 weeks.   Patient to be able to perform all job duties in 6-8 weeks.   Patient to return to exercise routine in 8-12 weeks without modification.     Plan     Outpatient Physical Therapy 2 times weekly for 12 weeks to include the following interventions: Electrical Stimulation IFC/TENS/NMES, Gait Training, Manual Therapy, Moist Heat/ Ice, Neuromuscular Re-ed, Patient Education, Self Care, Therapeutic Activities, Therapeutic Exercise, and Dry Needling.     Lit Cazares, PT , DPT

## 2023-09-28 ENCOUNTER — CLINICAL SUPPORT (OUTPATIENT)
Dept: REHABILITATION | Facility: HOSPITAL | Age: 48
End: 2023-09-28
Payer: COMMERCIAL

## 2023-09-28 DIAGNOSIS — M25.661 DECREASED RANGE OF MOTION (ROM) OF RIGHT KNEE: Primary | ICD-10-CM

## 2023-09-28 DIAGNOSIS — M23.200 OLD TEAR OF LATERAL MENISCUS OF RIGHT KNEE, UNSPECIFIED TEAR TYPE: ICD-10-CM

## 2023-09-28 DIAGNOSIS — R29.898 WEAKNESS OF RIGHT LOWER EXTREMITY: ICD-10-CM

## 2023-09-28 DIAGNOSIS — M94.261 CHONDROMALACIA OF RIGHT KNEE: ICD-10-CM

## 2023-09-28 DIAGNOSIS — R26.9 GAIT DISTURBANCE: ICD-10-CM

## 2023-09-28 DIAGNOSIS — M23.203 OLD TEAR OF MEDIAL MENISCUS OF RIGHT KNEE, UNSPECIFIED TEAR TYPE: ICD-10-CM

## 2023-09-28 PROCEDURE — 97530 THERAPEUTIC ACTIVITIES: CPT

## 2023-09-28 PROCEDURE — 97140 MANUAL THERAPY 1/> REGIONS: CPT

## 2023-09-28 PROCEDURE — 97112 NEUROMUSCULAR REEDUCATION: CPT

## 2023-09-28 NOTE — PROGRESS NOTES
[facility]   Physical Therapy Treatment Note      Name: Silas [facility] [address] Number: [id number]    Therapy Diagnosis:   Encounter Diagnoses   Name Primary?    Decreased range of motion (ROM) of right knee Yes    Weakness of right lower extremity     Gait disturbance     Old tear of medial meniscus of right knee, unspecified tear type     Old tear of lateral meniscus of right knee, unspecified tear type     Chondromalacia of right knee      Physician: Anshul Arteaga PA-C    Visit Date: 9/28/2023    Physician Orders: PT Eval and Treatment  Medical Diagnosis from Referral: Old tear of medial meniscus of the right knee (M23.203), old tear of lateral meniscus of right knee (M23.200), chondromalacia of right knee (94.261)    Evaluation Date: 9/12/2023  Authorization Period Expiration: 9/6/24  Plan of Care Expiration: 12/31/23  Date of Surgery: 9/11/23  Visit # / Visits authorized: 5/20 + Eval  FOTO: 1/ 3    PTA Visit #: 0/5     DATE OF PROCEDURE:  9/11/2023     PROCEDURES:   1. Right knee arthroscopic chondroplasty  2. Right knee arthroscopic partial medial and lateral meniscectomies  3. Right knee arthroscopic loose body removal  4. Extensive debridement right knee    Time In: 10:05 AM   Time Out: 11:05 AM   Total Billable Time: 60 minutes    Subjective     Pt reports: his knee extension is improving slowly with the use of the dynasplint. He would like to discharge the use of his crutches soon as he has been able to walk without the crutches at home.     He was compliant with home exercise program.  Pain: Not verbalized /10  Location: right knee      Objective      Objective Measures updated at progress report unless specified.     R Knee PROM from 9/21:  Pre-Tx: 0-  Post-Tx: 0-5-115    Treatment     Iván received the treatments listed below:      therapeutic exercises to develop strength, endurance, ROM, and flexibility for 00 minutes including:    manual therapy techniques:  "were applied for 18 minutes, including:  Posterior Tibial glides Grd III  R knee PROM   Hoffa's fat pad and patella mobilizations     neuromuscular re-education activities to improve: Balance, Coordination, Kinesthetic, Sense, Proprioception, and Motor Control for 29 minutes. The following activities were included:    Quad sets with NMES support for 10' with a 10" work to rest ratio   S/L SLR 3 x 10   Standing TKE with red TB 3 x 15   Heel prop 5# weights x10 mins at beginning of treatment    therapeutic activities to improve functional performance for 13 minutes, including:    Cora training (4 x 10)   Walking w/o crutches (3 x 20 feet)       Patient Education and Home Exercises       Education provided:   - Reviewed/updated HEP frequency and volume  - Heel prop x10 mins 10x/day  - 20 quad sets 10x/day     Written Home Exercises Provided: yes. Exercises were reviewed and Iván was able to demonstrate them prior to the end of the session.  Iván demonstrated good  understanding of the education provided. See EMR under Patient Instructions for exercises provided during therapy sessions    Assessment     Range of motion of the right knee was continued. Patient is benefiting from the dynasplint as he is now able to achieve extension to 3 degrees of flexion following a heel prop. Knee flexion remains limited as the patient reported anterior discomfort during passive flexion. Patellar mobilizations continued to address limited range of motion. Iván can perform a quad contraction, yet extensor lag remains when performing a straight leg raise supine. The patient finished with cora training to practice heel to toe gait mechanics.      Iván Is progressing well towards his goals.   Pt prognosis is Good.     Pt will continue to benefit from skilled outpatient physical therapy to address the deficits listed in the problem list box on initial evaluation, provide pt/family education and to maximize pt's level of independence " in the home and community environment.     Pt's spiritual, cultural and educational needs considered and pt agreeable to plan of care and goals.     Anticipated barriers to physical therapy: None    Goals:     Short Term Goals: 0-6 weeks   Patient to demonstrate isolated quad contraction in 2 weeks.   Patient to ambulate with a normal gait pattern in 2 weeks.   Patient to improve active knee extension to 0 degrees in 3 weeks.   Patient to be able to perform 25 SLR without an extensor lag in 4 weeks.  Patient to improve knee flexion to uninvolved side in 4-6 weeks.      Long Term Goals: 6-12 weeks   Patient to return to community ambulation without right knee pain in 6 weeks.   Patient to navigate step/curbs without right knee pain in 6-8 weeks.   Patient to be able to perform all job duties in 6-8 weeks.   Patient to return to exercise routine in 8-12 weeks without modification.     Plan     Outpatient Physical Therapy 2 times weekly for 12 weeks to include the following interventions: Electrical Stimulation IFC/TENS/NMES, Gait Training, Manual Therapy, Moist Heat/ Ice, Neuromuscular Re-ed, Patient Education, Self Care, Therapeutic Activities, Therapeutic Exercise, and Dry Needling.     Lit Cazares, PT , DPT

## 2023-10-03 ENCOUNTER — CLINICAL SUPPORT (OUTPATIENT)
Dept: REHABILITATION | Facility: HOSPITAL | Age: 48
End: 2023-10-03
Payer: COMMERCIAL

## 2023-10-03 DIAGNOSIS — R29.898 WEAKNESS OF RIGHT LOWER EXTREMITY: ICD-10-CM

## 2023-10-03 DIAGNOSIS — M94.261 CHONDROMALACIA OF RIGHT KNEE: ICD-10-CM

## 2023-10-03 DIAGNOSIS — M25.661 DECREASED RANGE OF MOTION (ROM) OF RIGHT KNEE: Primary | ICD-10-CM

## 2023-10-03 DIAGNOSIS — R26.9 GAIT DISTURBANCE: ICD-10-CM

## 2023-10-03 DIAGNOSIS — M23.200 OLD TEAR OF LATERAL MENISCUS OF RIGHT KNEE, UNSPECIFIED TEAR TYPE: ICD-10-CM

## 2023-10-03 DIAGNOSIS — M23.203 OLD TEAR OF MEDIAL MENISCUS OF RIGHT KNEE, UNSPECIFIED TEAR TYPE: ICD-10-CM

## 2023-10-03 PROCEDURE — 97112 NEUROMUSCULAR REEDUCATION: CPT

## 2023-10-03 PROCEDURE — 97140 MANUAL THERAPY 1/> REGIONS: CPT

## 2023-10-03 PROCEDURE — 97110 THERAPEUTIC EXERCISES: CPT

## 2023-10-03 NOTE — PROGRESS NOTES
[facility]   Physical Therapy Treatment Note      Name: Silas Stallings  [facility] Number: [id number]    Therapy Diagnosis:   Encounter Diagnoses   Name Primary?    Decreased range of motion (ROM) of right knee Yes    Weakness of right lower extremity     Gait disturbance     Old tear of medial meniscus of right knee, unspecified tear type     Old tear of lateral meniscus of right knee, unspecified tear type     Chondromalacia of right knee      Physician: Anshul Arteaga PA-C    Visit Date: 10/3/2023    Physician Orders: PT Eval and Treatment  Medical Diagnosis from Referral: Old tear of medial meniscus of the right knee (M23.203), old tear of lateral meniscus of right knee (M23.200), chondromalacia of right knee (94.261)    Evaluation Date: 9/12/2023  Authorization Period Expiration: 9/6/24  Plan of Care Expiration: 12/31/23  Date of Surgery: 9/11/23  Visit # / Visits authorized: 6 / 20 + Eval  FOTO: 1/ 3    PTA Visit #: 0/5     DATE OF PROCEDURE:  9/11/2023     PROCEDURES:   1. Right knee arthroscopic chondroplasty  2. Right knee arthroscopic partial medial and lateral meniscectomies  3. Right knee arthroscopic loose body removal  4. Extensive debridement right knee    Time In: 1000  Time Out: 1102  Total Billable Time: 54 minutes    Subjective     Pt reports: He used the dynasplint a lot this weekend. His knee feels sore but he can tell his extension is better.     He was compliant with home exercise program.  Pain: Not verbalized /10  Location: right knee      Objective      Objective Measures updated at progress report unless specified.     R Knee PROM from 9/21:  Pre-Tx: 0-8-110  Post-Tx: 0-4-115    Treatment     Iván received the treatments listed below:      therapeutic exercises to develop strength, endurance, ROM, and flexibility for 15 minutes including:  LLLD Heel Prop x15 mins 20#     manual therapy techniques: were applied for 18 minutes, including:  Posterior Tibial  "glides Grd III  Tibial ER/IR and lateral accessory glides   R knee PROM   Hoffa's fat pad and patella mobilizations     neuromuscular re-education activities to improve: Balance, Coordination, Kinesthetic, Sense, Proprioception, and Motor Control for 29 minutes. The following activities were included:  Quad sets 30x10"   Standing TKE with red TB 3 x 15   Retro uphill TM walk 10 mins 1 mph x7% incline     Not performed:  S/L SLR 3 x 10     therapeutic activities to improve functional performance for 00 minutes, including:         Patient Education and Home Exercises       Education provided:   - Reviewed/updated HEP frequency and volume  - Heel prop x10 mins 10x/day  - 20 quad sets 10x/day     Written Home Exercises Provided: yes. Exercises were reviewed and Iván was able to demonstrate them prior to the end of the session.  Iván demonstrated good  understanding of the education provided. See EMR under Patient Instructions for exercises provided during therapy sessions    Assessment     Good carry over of improved extension ROM from previous treatment with continued improvement in within tx today. Continued with emphasis on reinforcing newly obtained extension ROM with motor control and quad strengthening through new range. Improved gait mechanics with increased step length and stance time on R.       Iván Is progressing well towards his goals.   Pt prognosis is Good.     Pt will continue to benefit from skilled outpatient physical therapy to address the deficits listed in the problem list box on initial evaluation, provide pt/family education and to maximize pt's level of independence in the home and community environment.     Pt's spiritual, cultural and educational needs considered and pt agreeable to plan of care and goals.     Anticipated barriers to physical therapy: None    Goals:     Short Term Goals: 0-6 weeks   Patient to demonstrate isolated quad contraction in 2 weeks.   Patient to ambulate with a " normal gait pattern in 2 weeks.   Patient to improve active knee extension to 0 degrees in 3 weeks.   Patient to be able to perform 25 SLR without an extensor lag in 4 weeks.  Patient to improve knee flexion to uninvolved side in 4-6 weeks.      Long Term Goals: 6-12 weeks   Patient to return to community ambulation without right knee pain in 6 weeks.   Patient to navigate step/curbs without right knee pain in 6-8 weeks.   Patient to be able to perform all job duties in 6-8 weeks.   Patient to return to exercise routine in 8-12 weeks without modification.     Plan     Outpatient Physical Therapy 2 times weekly for 12 weeks to include the following interventions: Electrical Stimulation IFC/TENS/NMES, Gait Training, Manual Therapy, Moist Heat/ Ice, Neuromuscular Re-ed, Patient Education, Self Care, Therapeutic Activities, Therapeutic Exercise, and Dry Needling.     Renzo Hernandez, PT , DPT

## 2023-10-04 DIAGNOSIS — M23.200 OLD TEAR OF LATERAL MENISCUS OF RIGHT KNEE, UNSPECIFIED TEAR TYPE: ICD-10-CM

## 2023-10-04 DIAGNOSIS — M94.261 CHONDROMALACIA OF RIGHT KNEE: ICD-10-CM

## 2023-10-04 DIAGNOSIS — M23.203 OLD TEAR OF MEDIAL MENISCUS OF RIGHT KNEE, UNSPECIFIED TEAR TYPE: ICD-10-CM

## 2023-10-04 RX ORDER — ASPIRIN 325 MG
325 TABLET ORAL DAILY
Qty: 21 TABLET | Refills: 0 | OUTPATIENT
Start: 2023-10-04 | End: 2023-10-25

## 2023-10-05 ENCOUNTER — CLINICAL SUPPORT (OUTPATIENT)
Dept: REHABILITATION | Facility: HOSPITAL | Age: 48
End: 2023-10-05
Payer: COMMERCIAL

## 2023-10-05 DIAGNOSIS — R29.898 WEAKNESS OF RIGHT LOWER EXTREMITY: ICD-10-CM

## 2023-10-05 DIAGNOSIS — M23.200 OLD TEAR OF LATERAL MENISCUS OF RIGHT KNEE, UNSPECIFIED TEAR TYPE: ICD-10-CM

## 2023-10-05 DIAGNOSIS — R26.9 GAIT DISTURBANCE: ICD-10-CM

## 2023-10-05 DIAGNOSIS — M23.203 OLD TEAR OF MEDIAL MENISCUS OF RIGHT KNEE, UNSPECIFIED TEAR TYPE: ICD-10-CM

## 2023-10-05 DIAGNOSIS — M25.661 DECREASED RANGE OF MOTION (ROM) OF RIGHT KNEE: Primary | ICD-10-CM

## 2023-10-05 DIAGNOSIS — M94.261 CHONDROMALACIA OF RIGHT KNEE: ICD-10-CM

## 2023-10-05 PROCEDURE — 97110 THERAPEUTIC EXERCISES: CPT

## 2023-10-05 PROCEDURE — 97140 MANUAL THERAPY 1/> REGIONS: CPT

## 2023-10-05 PROCEDURE — 97112 NEUROMUSCULAR REEDUCATION: CPT

## 2023-10-05 NOTE — PROGRESS NOTES
[facility]   Physical Therapy Treatment Note      Name: Silas [facility] Number: [id number]    Therapy Diagnosis:   Encounter Diagnoses   Name Primary?    Decreased range of motion (ROM) of right knee Yes    Weakness of right lower extremity     Gait disturbance     Old tear of medial meniscus of right knee, unspecified tear type     Old tear of lateral meniscus of right knee, unspecified tear type     Chondromalacia of right knee        Physician: Anshul Arteaga PA-C    Visit Date: 10/5/2023    Physician Orders: PT Eval and Treatment  Medical Diagnosis from Referral: Old tear of medial meniscus of the right knee (M23.203), old tear of lateral meniscus of right knee (M23.200), chondromalacia of right knee (94.261)    Evaluation Date: 9/12/2023  Authorization Period Expiration: 9/6/24  Plan of Care Expiration: 12/31/23  Date of Surgery: 9/11/23  Visit # / Visits authorized: 7 / 20 + Eval  FOTO: 1/ 3    PTA Visit #: 0/5     DATE OF PROCEDURE:  9/11/2023     PROCEDURES:   1. Right knee arthroscopic chondroplasty  2. Right knee arthroscopic partial medial and lateral meniscectomies  3. Right knee arthroscopic loose body removal  4. Extensive debridement right knee    Time In: 0958  Time Out: 1100  Total Billable Time: 56 minutes    Subjective     Pt reports: He has some stinging at his medial knee but can tell his extension is improving.     He was compliant with home exercise program.  Pain: Not verbalized /10  Location: right knee      Objective      Objective Measures updated at progress report unless specified.     R Knee PROM from 9/21:  Pre-Tx: 0-6-110  Post-Tx: 0-3-115    Treatment     Iván received the treatments listed below:      therapeutic exercises to develop strength, endurance, ROM, and flexibility for 15 minutes including:  LLLD Heel Prop x15 mins 20#     manual therapy techniques: were applied for 17 minutes, including:  Posterior Tibial glides Grd III  Tibial  "ER/IR and lateral accessory glides   R knee PROM   Hoffa's fat pad and patella mobilizations     neuromuscular re-education activities to improve: Balance, Coordination, Kinesthetic, Sense, Proprioception, and Motor Control for 24 minutes. The following activities were included:  Neuromuscular stimulation to quadriceps with quad set 10":20" on:off x10 mins plus set up  - QS    Retro Rogue Sled Pull 45# <-> LOT 2x     Not performed:  S/L SLR 3 x 10     therapeutic activities to improve functional performance for 00 minutes, including:         Patient Education and Home Exercises       Education provided:   - Reviewed/updated HEP frequency and volume  - Heel prop x10 mins 10x/day  - 20 quad sets 10x/day     Written Home Exercises Provided: yes. Exercises were reviewed and Iván was able to demonstrate them prior to the end of the session.  Iván demonstrated good  understanding of the education provided. See EMR under Patient Instructions for exercises provided during therapy sessions    Assessment     Good carry over of improved extension ROM from previous treatment with continued improvement in within tx today. Continued with emphasis on reinforcing newly obtained extension ROM with motor control and quad strengthening through new range.Intro'd retro sled pulls with improved quad control with continued repetition.        Iván Is progressing well towards his goals.   Pt prognosis is Good.     Pt will continue to benefit from skilled outpatient physical therapy to address the deficits listed in the problem list box on initial evaluation, provide pt/family education and to maximize pt's level of independence in the home and community environment.     Pt's spiritual, cultural and educational needs considered and pt agreeable to plan of care and goals.     Anticipated barriers to physical therapy: None    Goals:     Short Term Goals: 0-6 weeks   Patient to demonstrate isolated quad contraction in 2 weeks.   Patient to " ambulate with a normal gait pattern in 2 weeks.   Patient to improve active knee extension to 0 degrees in 3 weeks.   Patient to be able to perform 25 SLR without an extensor lag in 4 weeks.  Patient to improve knee flexion to uninvolved side in 4-6 weeks.      Long Term Goals: 6-12 weeks   Patient to return to community ambulation without right knee pain in 6 weeks.   Patient to navigate step/curbs without right knee pain in 6-8 weeks.   Patient to be able to perform all job duties in 6-8 weeks.   Patient to return to exercise routine in 8-12 weeks without modification.     Plan     Outpatient Physical Therapy 2 times weekly for 12 weeks to include the following interventions: Electrical Stimulation IFC/TENS/NMES, Gait Training, Manual Therapy, Moist Heat/ Ice, Neuromuscular Re-ed, Patient Education, Self Care, Therapeutic Activities, Therapeutic Exercise, and Dry Needling.     Renzo Hernandez, PT , DPT  Board Certified in Sports Physical Therapy

## 2023-10-10 ENCOUNTER — CLINICAL SUPPORT (OUTPATIENT)
Dept: REHABILITATION | Facility: HOSPITAL | Age: 48
End: 2023-10-10
Payer: COMMERCIAL

## 2023-10-10 DIAGNOSIS — M94.261 CHONDROMALACIA OF RIGHT KNEE: ICD-10-CM

## 2023-10-10 DIAGNOSIS — R26.9 GAIT DISTURBANCE: ICD-10-CM

## 2023-10-10 DIAGNOSIS — R29.898 WEAKNESS OF RIGHT LOWER EXTREMITY: ICD-10-CM

## 2023-10-10 DIAGNOSIS — M23.203 OLD TEAR OF MEDIAL MENISCUS OF RIGHT KNEE, UNSPECIFIED TEAR TYPE: ICD-10-CM

## 2023-10-10 DIAGNOSIS — M23.200 OLD TEAR OF LATERAL MENISCUS OF RIGHT KNEE, UNSPECIFIED TEAR TYPE: ICD-10-CM

## 2023-10-10 DIAGNOSIS — M25.661 DECREASED RANGE OF MOTION (ROM) OF RIGHT KNEE: Primary | ICD-10-CM

## 2023-10-10 PROCEDURE — 97110 THERAPEUTIC EXERCISES: CPT

## 2023-10-10 PROCEDURE — 97140 MANUAL THERAPY 1/> REGIONS: CPT

## 2023-10-10 PROCEDURE — 97112 NEUROMUSCULAR REEDUCATION: CPT

## 2023-10-10 NOTE — PROGRESS NOTES
[facility]   Physical Therapy Treatment Note      Name: Silas [facility] Number: [id number]    Therapy Diagnosis:   Encounter Diagnoses   Name Primary?    Decreased range of motion (ROM) of right knee Yes    Weakness of right lower extremity     Gait disturbance     Old tear of medial meniscus of right knee, unspecified tear type     Old tear of lateral meniscus of right knee, unspecified tear type     Chondromalacia of right knee        Physician: Anshul Arteaga PA-C    Visit Date: 10/10/2023    Physician Orders: PT Eval and Treatment  Medical Diagnosis from Referral: Old tear of medial meniscus of the right knee (M23.203), old tear of lateral meniscus of right knee (M23.200), chondromalacia of right knee (94.261)    Evaluation Date: 9/12/2023  Authorization Period Expiration: 9/6/24  Plan of Care Expiration: 12/31/23  Date of Surgery: 9/11/23  Visit # / Visits authorized: 8 / 20 + Eval  FOTO: 1/ 3    PTA Visit #: 0/5     DATE OF PROCEDURE:  9/11/2023     PROCEDURES:   1. Right knee arthroscopic chondroplasty  2. Right knee arthroscopic partial medial and lateral meniscectomies  3. Right knee arthroscopic loose body removal  4. Extensive debridement right knee    Time In: 8:05 AM   Time Out: 9:04 AM   Total Billable Time: 59 minutes    Subjective     Pt reports: He has been working on his knee extension at home, using his dynasplint as directed. He reports tenderness at the anterior/medial knee upon arrival to physical therapy.     He was compliant with home exercise program.  Pain: Not verbalized /10  Location: right knee      Objective      Objective Measures updated at progress report unless specified.     R Knee PROM from 9/21:  Pre-Tx: 0-6-110  Post-Tx: 0-3-115    Treatment     Iván received the treatments listed below:      therapeutic exercises to develop strength, endurance, ROM, and flexibility for 18 minutes including:  LLLD Heel Prop x10 mins 10#   SL Knee  "extension matrix 3 x 10 x 3" hold, 15#      manual therapy techniques: were applied for 17 minutes, including:  Posterior Tibial glides Grd III  R knee PROM   Hoffa's fat pad and patella mobilizations     neuromuscular re-education activities to improve: Balance, Coordination, Kinesthetic, Sense, Proprioception, and Motor Control for 24 minutes. The following activities were included:  Neuromuscular stimulation to quadriceps with quad set 10":20" on:off x10 mins plus set up  - QS x 5'/SLR standing x 5'   Standing TKE with Red TB 3 x 15 x 5" hold   Retro Rogue Sled Pull 45# <-> 3 x 20 yds        therapeutic activities to improve functional performance for 00 minutes, including:         Patient Education and Home Exercises       Education provided:   - Reviewed/updated HEP frequency and volume  - Heel prop x10 mins 10x/day  - 20 quad sets 10x/day     Written Home Exercises Provided: yes. Exercises were reviewed and Iván was able to demonstrate them prior to the end of the session.  Iván demonstrated good  understanding of the education provided. See EMR under Patient Instructions for exercises provided during therapy sessions    Assessment     Knee extension range of motion continues to improve evident during initial contact of the stance phase. Patient's quad strength addressed throughout treatment session as he was introduced to the knee extension machine using light resistance. Patient educated upon importance of performing home exercise program to gain as much knee extension motion as he can.         Iván Is progressing well towards his goals.   Pt prognosis is Good.     Pt will continue to benefit from skilled outpatient physical therapy to address the deficits listed in the problem list box on initial evaluation, provide pt/family education and to maximize pt's level of independence in the home and community environment.     Pt's spiritual, cultural and educational needs considered and pt agreeable to plan of " care and goals.     Anticipated barriers to physical therapy: None    Goals:     Short Term Goals: 0-6 weeks   Patient to demonstrate isolated quad contraction in 2 weeks.   Patient to ambulate with a normal gait pattern in 2 weeks.   Patient to improve active knee extension to 0 degrees in 3 weeks.   Patient to be able to perform 25 SLR without an extensor lag in 4 weeks.  Patient to improve knee flexion to uninvolved side in 4-6 weeks.      Long Term Goals: 6-12 weeks   Patient to return to community ambulation without right knee pain in 6 weeks.   Patient to navigate step/curbs without right knee pain in 6-8 weeks.   Patient to be able to perform all job duties in 6-8 weeks.   Patient to return to exercise routine in 8-12 weeks without modification.     Plan     Outpatient Physical Therapy 2 times weekly for 12 weeks to include the following interventions: Electrical Stimulation IFC/TENS/NMES, Gait Training, Manual Therapy, Moist Heat/ Ice, Neuromuscular Re-ed, Patient Education, Self Care, Therapeutic Activities, Therapeutic Exercise, and Dry Needling.     Lit Cazares, PT , DPT  Board Certified in Sports Physical Therapy

## 2023-10-12 ENCOUNTER — CLINICAL SUPPORT (OUTPATIENT)
Dept: REHABILITATION | Facility: HOSPITAL | Age: 48
End: 2023-10-12
Payer: COMMERCIAL

## 2023-10-12 DIAGNOSIS — R29.898 WEAKNESS OF RIGHT LOWER EXTREMITY: ICD-10-CM

## 2023-10-12 DIAGNOSIS — M94.261 CHONDROMALACIA OF RIGHT KNEE: ICD-10-CM

## 2023-10-12 DIAGNOSIS — M23.200 OLD TEAR OF LATERAL MENISCUS OF RIGHT KNEE, UNSPECIFIED TEAR TYPE: ICD-10-CM

## 2023-10-12 DIAGNOSIS — M23.203 OLD TEAR OF MEDIAL MENISCUS OF RIGHT KNEE, UNSPECIFIED TEAR TYPE: ICD-10-CM

## 2023-10-12 DIAGNOSIS — R26.9 GAIT DISTURBANCE: ICD-10-CM

## 2023-10-12 DIAGNOSIS — M25.661 DECREASED RANGE OF MOTION (ROM) OF RIGHT KNEE: Primary | ICD-10-CM

## 2023-10-12 PROCEDURE — 97112 NEUROMUSCULAR REEDUCATION: CPT

## 2023-10-12 PROCEDURE — 97140 MANUAL THERAPY 1/> REGIONS: CPT

## 2023-10-12 PROCEDURE — 97110 THERAPEUTIC EXERCISES: CPT

## 2023-10-12 NOTE — PROGRESS NOTES
"[facility]   Physical Therapy Treatment Note      Name: Silas [facility] Number: [id number]    Therapy Diagnosis:   Encounter Diagnoses   Name Primary?    Decreased range of motion (ROM) of right knee Yes    Weakness of right lower extremity     Gait disturbance     Old tear of medial meniscus of right knee, unspecified tear type     Old tear of lateral meniscus of right knee, unspecified tear type     Chondromalacia of right knee        Physician: Anshul Arteaga PA-C    Visit Date: 10/12/2023    Physician Orders: PT Eval and Treatment  Medical Diagnosis from Referral: Old tear of medial meniscus of the right knee (M23.203), old tear of lateral meniscus of right knee (M23.200), chondromalacia of right knee (94.261)    Evaluation Date: 9/12/2023  Authorization Period Expiration: 9/6/24  Plan of Care Expiration: 12/31/23  Date of Surgery: 9/11/23  Visit # / Visits authorized: 9 / 20 + Eval  FOTO: 1/ 3    PTA Visit #: 0/5     DATE OF PROCEDURE:  9/11/2023     PROCEDURES:   1. Right knee arthroscopic chondroplasty  2. Right knee arthroscopic partial medial and lateral meniscectomies  3. Right knee arthroscopic loose body removal  4. Extensive debridement right knee    Time In: 0946  Time Out: 1052  Total Billable Time: 60 minutes    Subjective     Pt reports: His knee is doing well. Hasn't worked on his exercises this morning.     He was compliant with home exercise program.  Pain: Not verbalized /10  Location: right knee      Objective      Objective Measures updated at progress report unless specified.     R Knee PROM from 9/21:  Pre-Tx: 0-6-110  Post-Tx: 0-3-115    Treatment     Iván received the treatments listed below:      therapeutic exercises to develop strength, endurance, ROM, and flexibility for 22 minutes including:  LLLD Heel Prop x10 mins 10#   SL Knee extension matrix 3 x 10 x 3" hold, 15#    Heel Slide x30     manual therapy techniques: were applied for 12 minutes, " "including:  Posterior Tibial glides Grd III  R knee PROM   Hoffa's fat pad and patella mobilizations     neuromuscular re-education activities to improve: Balance, Coordination, Kinesthetic, Sense, Proprioception, and Motor Control for 26 minutes. The following activities were included:  Standing TKE with Red TB 3 x 15 x 5" hold   Short Ellen Retro Step overs 2x10 laps   Retro Rogue Sled Pull 45# <-> 3 x 20 yds        therapeutic activities to improve functional performance for 00 minutes, including:         Patient Education and Home Exercises       Education provided:   - Reviewed/updated HEP frequency and volume  - Heel prop x10 mins 10x/day  - 20 quad sets 10x/day     Written Home Exercises Provided: yes. Exercises were reviewed and Iván was able to demonstrate them prior to the end of the session.  Iván demonstrated good  understanding of the education provided. See EMR under Patient Instructions for exercises provided during therapy sessions    Assessment     Knee extension range of motion with good carryover from previous tx. Continued weakness and decreased quad control that is improving.         Iván Is progressing well towards his goals.   Pt prognosis is Good.     Pt will continue to benefit from skilled outpatient physical therapy to address the deficits listed in the problem list box on initial evaluation, provide pt/family education and to maximize pt's level of independence in the home and community environment.     Pt's spiritual, cultural and educational needs considered and pt agreeable to plan of care and goals.     Anticipated barriers to physical therapy: None    Goals:     Short Term Goals: 0-6 weeks   Patient to demonstrate isolated quad contraction in 2 weeks.   Patient to ambulate with a normal gait pattern in 2 weeks.   Patient to improve active knee extension to 0 degrees in 3 weeks.   Patient to be able to perform 25 SLR without an extensor lag in 4 weeks.  Patient to improve knee " flexion to uninvolved side in 4-6 weeks.      Long Term Goals: 6-12 weeks   Patient to return to community ambulation without right knee pain in 6 weeks.   Patient to navigate step/curbs without right knee pain in 6-8 weeks.   Patient to be able to perform all job duties in 6-8 weeks.   Patient to return to exercise routine in 8-12 weeks without modification.     Plan     Outpatient Physical Therapy 2 times weekly for 12 weeks to include the following interventions: Electrical Stimulation IFC/TENS/NMES, Gait Training, Manual Therapy, Moist Heat/ Ice, Neuromuscular Re-ed, Patient Education, Self Care, Therapeutic Activities, Therapeutic Exercise, and Dry Needling.     Renzo Hernandez, PT , DPT  Board Certified in Sports Physical Therapy

## 2023-10-18 ENCOUNTER — PATIENT MESSAGE (OUTPATIENT)
Dept: REHABILITATION | Facility: HOSPITAL | Age: 48
End: 2023-10-18
Payer: COMMERCIAL

## 2023-10-19 ENCOUNTER — CLINICAL SUPPORT (OUTPATIENT)
Dept: REHABILITATION | Facility: HOSPITAL | Age: 48
End: 2023-10-19
Payer: COMMERCIAL

## 2023-10-19 DIAGNOSIS — R29.898 WEAKNESS OF RIGHT LOWER EXTREMITY: ICD-10-CM

## 2023-10-19 DIAGNOSIS — M25.661 DECREASED RANGE OF MOTION (ROM) OF RIGHT KNEE: Primary | ICD-10-CM

## 2023-10-19 DIAGNOSIS — M94.261 CHONDROMALACIA OF RIGHT KNEE: ICD-10-CM

## 2023-10-19 DIAGNOSIS — R26.9 GAIT DISTURBANCE: ICD-10-CM

## 2023-10-19 DIAGNOSIS — M23.203 OLD TEAR OF MEDIAL MENISCUS OF RIGHT KNEE, UNSPECIFIED TEAR TYPE: ICD-10-CM

## 2023-10-19 DIAGNOSIS — M23.200 OLD TEAR OF LATERAL MENISCUS OF RIGHT KNEE, UNSPECIFIED TEAR TYPE: ICD-10-CM

## 2023-10-19 PROCEDURE — 97112 NEUROMUSCULAR REEDUCATION: CPT

## 2023-10-19 PROCEDURE — 97110 THERAPEUTIC EXERCISES: CPT

## 2023-10-19 PROCEDURE — 97140 MANUAL THERAPY 1/> REGIONS: CPT

## 2023-10-19 NOTE — PROGRESS NOTES
"[facility]   Physical Therapy Treatment Note      Name: Silas [facility] Number: [id number]    Therapy Diagnosis:   Encounter Diagnoses   Name Primary?    Decreased range of motion (ROM) of right knee Yes    Weakness of right lower extremity     Gait disturbance     Old tear of medial meniscus of right knee, unspecified tear type     Old tear of lateral meniscus of right knee, unspecified tear type     Chondromalacia of right knee        Physician: Anshul Arteaga PA-C    Visit Date: 10/19/2023    Physician Orders: PT Eval and Treatment  Medical Diagnosis from Referral: Old tear of medial meniscus of the right knee (M23.203), old tear of lateral meniscus of right knee (M23.200), chondromalacia of right knee (94.261)    Evaluation Date: 9/12/2023  Authorization Period Expiration: 9/6/24  Plan of Care Expiration: 12/31/23  Date of Surgery: 9/11/23  Visit # / Visits authorized: 10 / 20 + Eval  FOTO: 1/ 3    PTA Visit #: 0/5     DATE OF PROCEDURE:  9/11/2023     PROCEDURES:   1. Right knee arthroscopic chondroplasty  2. Right knee arthroscopic partial medial and lateral meniscectomies  3. Right knee arthroscopic loose body removal  4. Extensive debridement right knee    Time In: 1100  Time Out: 1159  Total Billable Time: 59 minutes    Subjective     Pt reports: His knee is doing well. Having some anterior medial knee discomfort today.     He was compliant with home exercise program.  Pain: Not verbalized /10  Location: right knee      Objective      Objective Measures updated at progress report unless specified.     R Knee PROM from 9/21:  Pre-Tx: 0-6-110  Post-Tx: 0-3-115    Treatment     Iván received the treatments listed below:      therapeutic exercises to develop strength, endurance, ROM, and flexibility for 22 minutes including:  LLLD Heel Prop x10 mins 10#   SL Shuttle 50# 3x15     Not performed:   SL Knee extension matrix 3 x 10 x 3" hold, 15#    Heel Slide x30     manual " "therapy techniques: were applied for 12 minutes, including:  Posterior Tibial glides Grd III  R knee PROM   Hoffa's fat pad and patella mobilizations     neuromuscular re-education activities to improve: Balance, Coordination, Kinesthetic, Sense, Proprioception, and Motor Control for 25 minutes. The following activities were included:    Neuromuscular stimulation to quadriceps with quad set 10":20" on:off x10 mins plus set up  - SLR    7% incline TM retro walking 1 mphs x8 mins      therapeutic activities to improve functional performance for 00 minutes, including:         Patient Education and Home Exercises       Education provided:   - Reviewed/updated HEP frequency and volume  - Heel prop x10 mins 10x/day  - 20 quad sets 10x/day     Written Home Exercises Provided: yes. Exercises were reviewed and Iván was able to demonstrate them prior to the end of the session.  Iván demonstrated good  understanding of the education provided. See EMR under Patient Instructions for exercises provided during therapy sessions    Assessment     Knee extension range of motion with good carryover from previous tx. Continued weakness and decreased quad control that is improving. Improved tone with NMES.         Iván Is progressing well towards his goals.   Pt prognosis is Good.     Pt will continue to benefit from skilled outpatient physical therapy to address the deficits listed in the problem list box on initial evaluation, provide pt/family education and to maximize pt's level of independence in the home and community environment.     Pt's spiritual, cultural and educational needs considered and pt agreeable to plan of care and goals.     Anticipated barriers to physical therapy: None    Goals:     Short Term Goals: 0-6 weeks   Patient to demonstrate isolated quad contraction in 2 weeks.   Patient to ambulate with a normal gait pattern in 2 weeks.   Patient to improve active knee extension to 0 degrees in 3 weeks.   Patient to " be able to perform 25 SLR without an extensor lag in 4 weeks.  Patient to improve knee flexion to uninvolved side in 4-6 weeks.      Long Term Goals: 6-12 weeks   Patient to return to community ambulation without right knee pain in 6 weeks.   Patient to navigate step/curbs without right knee pain in 6-8 weeks.   Patient to be able to perform all job duties in 6-8 weeks.   Patient to return to exercise routine in 8-12 weeks without modification.     Plan     Outpatient Physical Therapy 2 times weekly for 12 weeks to include the following interventions: Electrical Stimulation IFC/TENS/NMES, Gait Training, Manual Therapy, Moist Heat/ Ice, Neuromuscular Re-ed, Patient Education, Self Care, Therapeutic Activities, Therapeutic Exercise, and Dry Needling.     Renzo Hernandez, PT , DPT  Board Certified in Sports Physical Therapy

## 2023-10-23 ENCOUNTER — CLINICAL SUPPORT (OUTPATIENT)
Dept: REHABILITATION | Facility: HOSPITAL | Age: 48
End: 2023-10-23
Payer: COMMERCIAL

## 2023-10-23 DIAGNOSIS — M25.661 DECREASED RANGE OF MOTION (ROM) OF RIGHT KNEE: Primary | ICD-10-CM

## 2023-10-23 DIAGNOSIS — M23.200 OLD TEAR OF LATERAL MENISCUS OF RIGHT KNEE, UNSPECIFIED TEAR TYPE: ICD-10-CM

## 2023-10-23 DIAGNOSIS — M23.203 OLD TEAR OF MEDIAL MENISCUS OF RIGHT KNEE, UNSPECIFIED TEAR TYPE: ICD-10-CM

## 2023-10-23 DIAGNOSIS — M94.261 CHONDROMALACIA OF RIGHT KNEE: ICD-10-CM

## 2023-10-23 DIAGNOSIS — R26.9 GAIT DISTURBANCE: ICD-10-CM

## 2023-10-23 DIAGNOSIS — R29.898 WEAKNESS OF RIGHT LOWER EXTREMITY: ICD-10-CM

## 2023-10-23 PROCEDURE — 97140 MANUAL THERAPY 1/> REGIONS: CPT

## 2023-10-23 PROCEDURE — 97530 THERAPEUTIC ACTIVITIES: CPT

## 2023-10-23 PROCEDURE — 97112 NEUROMUSCULAR REEDUCATION: CPT

## 2023-10-23 PROCEDURE — 97110 THERAPEUTIC EXERCISES: CPT

## 2023-10-23 NOTE — PROGRESS NOTES
[facility]   Physical Therapy Treatment Note      Name: Silas [facility] Number: [id number]    Therapy Diagnosis:   Encounter Diagnoses   Name Primary?    Decreased range of motion (ROM) of right knee Yes    Weakness of right lower extremity     Gait disturbance     Old tear of medial meniscus of right knee, unspecified tear type     Old tear of lateral meniscus of right knee, unspecified tear type     Chondromalacia of right knee        Physician: Anshul Arteaga PA-C    Visit Date: 10/23/2023    Physician Orders: PT Eval and Treatment  Medical Diagnosis from Referral: Old tear of medial meniscus of the right knee (M23.203), old tear of lateral meniscus of right knee (M23.200), chondromalacia of right knee (94.261)    Evaluation Date: 9/12/2023  Authorization Period Expiration: 9/6/24  Plan of Care Expiration: 12/31/23  Date of Surgery: 9/11/23  Visit # / Visits authorized: 11 / 20 + Eval  FOTO: 1/ 3    PTA Visit #: 0/5     DATE OF PROCEDURE:  9/11/2023     PROCEDURES:   1. Right knee arthroscopic chondroplasty  2. Right knee arthroscopic partial medial and lateral meniscectomies  3. Right knee arthroscopic loose body removal  4. Extensive debridement right knee    Time In: 1000  Time Out: 1101  Total Billable Time: 57 minutes    Subjective     Pt reports: He has continued working on his ROM and is doing well maintaining his extension. He has not been working on flexion.     He was compliant with home exercise program.  Pain: Not verbalized /10  Location: right knee      Objective      Objective Measures updated at progress report unless specified.     R Knee PROM from 10/23:  Pre-Tx: 0-3-115  Post-Tx: 0-3-120    Treatment     Iván received the treatments listed below:      therapeutic exercises to develop strength, endurance, ROM, and flexibility for 16 minutes including:  Bike completed for 8 min to increase ROM, endurance and decrease pain to improve tolerance to ADLs and  "age related activities.   Heel Slide x30     Not performed:   SL Shuttle 50# 3x15   SL Knee extension matrix 3 x 10 x 3" hold, 15#      manual therapy techniques: were applied for 15 minutes, including:  Posterior Tibial glides Grd III  R knee PROM   Hoffa's fat pad and patella mobilizations     neuromuscular re-education activities to improve: Balance, Coordination, Kinesthetic, Sense, Proprioception, and Motor Control for 8 minutes. The following activities were included:  SLR x30     therapeutic activities to improve functional performance for 18 minutes, including:   Rogue retro sled pull 70# <-> 4x       Patient Education and Home Exercises       Education provided:   - Reviewed/updated HEP frequency and volume      Written Home Exercises Provided: yes. Exercises were reviewed and Iván was able to demonstrate them prior to the end of the session.  Iván demonstrated good  understanding of the education provided. See EMR under Patient Instructions for exercises provided during therapy sessions    Assessment     Knee extension range of motion with good carryover from previous tx. Responded well to manual followed by ROM exercise Flexion ROM improved with manual followed by mobility focused interventions.         Iván Is progressing well towards his goals.   Pt prognosis is Good.     Pt will continue to benefit from skilled outpatient physical therapy to address the deficits listed in the problem list box on initial evaluation, provide pt/family education and to maximize pt's level of independence in the home and community environment.     Pt's spiritual, cultural and educational needs considered and pt agreeable to plan of care and goals.     Anticipated barriers to physical therapy: None    Goals:     Short Term Goals: 0-6 weeks   Patient to demonstrate isolated quad contraction in 2 weeks.   Patient to ambulate with a normal gait pattern in 2 weeks.   Patient to improve active knee extension to 0 degrees in 3 " weeks.   Patient to be able to perform 25 SLR without an extensor lag in 4 weeks.  Patient to improve knee flexion to uninvolved side in 4-6 weeks.      Long Term Goals: 6-12 weeks   Patient to return to community ambulation without right knee pain in 6 weeks.   Patient to navigate step/curbs without right knee pain in 6-8 weeks.   Patient to be able to perform all job duties in 6-8 weeks.   Patient to return to exercise routine in 8-12 weeks without modification.     Plan     Outpatient Physical Therapy 2 times weekly for 12 weeks to include the following interventions: Electrical Stimulation IFC/TENS/NMES, Gait Training, Manual Therapy, Moist Heat/ Ice, Neuromuscular Re-ed, Patient Education, Self Care, Therapeutic Activities, Therapeutic Exercise, and Dry Needling.     Renzo Hernandez, PT , DPT  Board Certified in Sports Physical Therapy

## 2023-10-24 ENCOUNTER — OFFICE VISIT (OUTPATIENT)
Dept: SPORTS MEDICINE | Facility: CLINIC | Age: 48
End: 2023-10-24
Payer: COMMERCIAL

## 2023-10-24 VITALS
BODY MASS INDEX: 24.94 KG/M2 | SYSTOLIC BLOOD PRESSURE: 123 MMHG | HEIGHT: 70 IN | DIASTOLIC BLOOD PRESSURE: 78 MMHG | HEART RATE: 85 BPM | WEIGHT: 174.19 LBS

## 2023-10-24 DIAGNOSIS — M94.261 CHONDROMALACIA OF RIGHT KNEE: ICD-10-CM

## 2023-10-24 DIAGNOSIS — M23.200 OLD TEAR OF LATERAL MENISCUS OF RIGHT KNEE, UNSPECIFIED TEAR TYPE: ICD-10-CM

## 2023-10-24 DIAGNOSIS — Z98.890 S/P ARTHROSCOPIC SURGERY OF RIGHT KNEE: Primary | ICD-10-CM

## 2023-10-24 DIAGNOSIS — M23.203 OLD TEAR OF MEDIAL MENISCUS OF RIGHT KNEE, UNSPECIFIED TEAR TYPE: ICD-10-CM

## 2023-10-24 PROCEDURE — 99999 PR PBB SHADOW E&M-EST. PATIENT-LVL III: CPT | Mod: PBBFAC,,, | Performed by: ORTHOPAEDIC SURGERY

## 2023-10-24 PROCEDURE — 99024 PR POST-OP FOLLOW-UP VISIT: ICD-10-PCS | Mod: S$GLB,,, | Performed by: ORTHOPAEDIC SURGERY

## 2023-10-24 PROCEDURE — 99024 POSTOP FOLLOW-UP VISIT: CPT | Mod: S$GLB,,, | Performed by: ORTHOPAEDIC SURGERY

## 2023-10-24 PROCEDURE — 99999 PR PBB SHADOW E&M-EST. PATIENT-LVL III: ICD-10-PCS | Mod: PBBFAC,,, | Performed by: ORTHOPAEDIC SURGERY

## 2023-10-24 RX ORDER — TRAMADOL HYDROCHLORIDE 50 MG/1
50 TABLET ORAL EVERY 6 HOURS PRN
Qty: 20 TABLET | Refills: 0 | Status: SHIPPED | OUTPATIENT
Start: 2023-10-24

## 2023-10-24 RX ORDER — ASPIRIN 325 MG
325 TABLET ORAL DAILY
Qty: 21 TABLET | Refills: 0 | Status: CANCELLED | OUTPATIENT
Start: 2023-10-24 | End: 2023-11-14

## 2023-10-24 NOTE — LETTER
Patient: Silas Stallings   YOB: 1975   Clinic Number: [id number]   Today's Date: October 24, 2023        Work Status Summary         Work Status: NOT ABLE to work at present. Work status will be updated at the next scheduled appointment on 12/5/23.    Therapy Recomendations: Physical Therapy 2 times a week for 6 weeks.         Next Appointment: Tuesday December 12/5/23. Silas will be seen by Dr. Ricky Menjivar.            October 24, 2023    Ricky Menjivar MD

## 2023-10-24 NOTE — PROGRESS NOTES
"CC: Right knee scope post op 6 weeks    Patient is here for his 6 week post op appointment s/p below and is doing well. Patient is doing PT at [facility] and is progressing as expected. Patient is using Dynasplint for ROM and is progressing well.   Mild pain reported today.     DATE OF PROCEDURE:  9/11/2023      PREOPERATIVE DIAGNOSES:   1. Right knee chondromalacia  2. Right knee medial meniscus tear.   3. Right knee lateral meniscus tear  4. Right knee loose bodies  5. Extensive scar tissue, right knee     POSTOPERATIVE DIAGNOSES:   1. Right knee chondromalacia  2. Right knee medial meniscus tear.   3. Right knee lateral meniscus tear  4. Right knee loose bodies  5. Extensive scar tissue, right knee (complex)     PROCEDURES:   1. Right knee arthroscopic chondroplasty  2. Right knee arthroscopic partial medial and lateral meniscectomies  3. Right knee arthroscopic loose body removal  4. Extensive debridement right knee     SURGEON: Ricky Menjivar M.D.     PE:    /78   Pulse 85   Ht 5' 10" (1.778 m)   Wt 79 kg (174 lb 2.6 oz)   BMI 24.99 kg/m²      Right knee:    Medial arthroscopy portal well healed.  Lateral arthroscopy portal with mild erythema and damp the appearance with a foul odor.  No appreciated warmth, fluctuance, or expressible discharge.  Mild swelling  Compartments soft  Neurovascular status intact in extremity    AROM 0 to 110 degrees  Decreased quad strength      Assessment:  6 weeks s/p right knee arthroscopic chondroplasty, partial medial lateral meniscectomies, loose body removal, and extensive debridement    Plan:  1.  Continue PT    2. Not cleared to RTW\    3. Continue dynasplint    4. F/u in 6 weeks.       All questions were answered. Instructed patient to call with questions or concerns in the interim.     "

## 2023-10-25 ENCOUNTER — PATIENT MESSAGE (OUTPATIENT)
Dept: SPORTS MEDICINE | Facility: CLINIC | Age: 48
End: 2023-10-25
Payer: COMMERCIAL

## 2023-10-27 ENCOUNTER — PATIENT MESSAGE (OUTPATIENT)
Dept: SPORTS MEDICINE | Facility: CLINIC | Age: 48
End: 2023-10-27
Payer: COMMERCIAL

## 2023-11-02 ENCOUNTER — CLINICAL SUPPORT (OUTPATIENT)
Dept: REHABILITATION | Facility: HOSPITAL | Age: 48
End: 2023-11-02
Payer: COMMERCIAL

## 2023-11-02 DIAGNOSIS — M23.200 OLD TEAR OF LATERAL MENISCUS OF RIGHT KNEE, UNSPECIFIED TEAR TYPE: ICD-10-CM

## 2023-11-02 DIAGNOSIS — M94.261 CHONDROMALACIA OF RIGHT KNEE: ICD-10-CM

## 2023-11-02 DIAGNOSIS — M25.661 DECREASED RANGE OF MOTION (ROM) OF RIGHT KNEE: Primary | ICD-10-CM

## 2023-11-02 DIAGNOSIS — R26.9 GAIT DISTURBANCE: ICD-10-CM

## 2023-11-02 DIAGNOSIS — R29.898 WEAKNESS OF RIGHT LOWER EXTREMITY: ICD-10-CM

## 2023-11-02 DIAGNOSIS — M23.203 OLD TEAR OF MEDIAL MENISCUS OF RIGHT KNEE, UNSPECIFIED TEAR TYPE: ICD-10-CM

## 2023-11-02 PROCEDURE — 97140 MANUAL THERAPY 1/> REGIONS: CPT

## 2023-11-02 PROCEDURE — 97110 THERAPEUTIC EXERCISES: CPT

## 2023-11-02 PROCEDURE — 97112 NEUROMUSCULAR REEDUCATION: CPT

## 2023-11-02 NOTE — PROGRESS NOTES
[facility]   Physical Therapy Treatment Note      Name: Silas [facility] Number: [id number]    Therapy Diagnosis:   Encounter Diagnoses   Name Primary?    Decreased range of motion (ROM) of right knee Yes    Weakness of right lower extremity     Gait disturbance     Old tear of medial meniscus of right knee, unspecified tear type     Old tear of lateral meniscus of right knee, unspecified tear type     Chondromalacia of right knee        Physician: Anshul Arteaga PA-C    Visit Date: 11/2/2023    Physician Orders: PT Eval and Treatment  Medical Diagnosis from Referral: Old tear of medial meniscus of the right knee (M23.203), old tear of lateral meniscus of right knee (M23.200), chondromalacia of right knee (94.261)    Evaluation Date: 9/12/2023  Authorization Period Expiration: 9/6/24  Plan of Care Expiration: 12/31/23  Date of Surgery: 9/11/23  Visit # / Visits authorized: 12 / 20 + Eval  FOTO: 1/ 3    PTA Visit #: 0/5     DATE OF PROCEDURE:  9/11/2023     PROCEDURES:   1. Right knee arthroscopic chondroplasty  2. Right knee arthroscopic partial medial and lateral meniscectomies  3. Right knee arthroscopic loose body removal  4. Extensive debridement right knee    Time In: 1055  Time Out: 1157  Total Billable Time: 57 minutes    Subjective     Pt reports: He saw Dr. Menjivar who was pleased with his extension. He would like to see him regain some muscle and thigh girth and Iván reports he feels ready to initiate more strengthening.     He was compliant with home exercise program.  Pain: Not verbalized /10  Location: right knee      Objective      Objective Measures updated at progress report unless specified.     R Knee PROM from 10/23:  Pre-Tx: 0-3-115  Post-Tx: 0-3-120    Treatment     Iván received the treatments listed below:      therapeutic exercises to develop strength, endurance, ROM, and flexibility for 10 minutes including:  Bike completed for 6 min to increase  "ROM, endurance and decrease pain to improve tolerance to ADLs and age related activities.   Popliteal gapping hand heel rock x20     Not performed:   SL Shuttle 50# 3x15   SL Knee extension matrix 3 x 10 x 3" hold, 15#      manual therapy techniques: were applied for 15 minutes, including:  Posterior Tibial glides Grd III  R knee PROM   Hoffa's fat pad and patella mobilizations     neuromuscular re-education activities to improve: Balance, Coordination, Kinesthetic, Sense, Proprioception, and Motor Control for 32 minutes. The following activities were included:    BFR completed to improve strength/hypertrophy of quad/hamstring of R LE at 80% NWB & 60% WB. Reps 30/15/15/15  - Body Squat   - LAQ 4#  - Bridges       therapeutic activities to improve functional performance for 00 minutes, including:        Patient Education and Home Exercises       Education provided:   - Reviewed/updated HEP frequency and volume      Written Home Exercises Provided: yes. Exercises were reviewed and Iván was able to demonstrate them prior to the end of the session.  Iván demonstrated good  understanding of the education provided. See EMR under Patient Instructions for exercises provided during therapy sessions    Assessment     Good maintenance of extension ROM. Intro'd BFR once pt was informed of mechanism and primary risk of muscle soreness and once contraindications were cleared. He tolerated this well. We will continue to use BFR moving forward to achieve goals of strength and hypertrophy and continued with frequency 2x/week.         Iván Is progressing well towards his goals.   Pt prognosis is Good.     Pt will continue to benefit from skilled outpatient physical therapy to address the deficits listed in the problem list box on initial evaluation, provide pt/family education and to maximize pt's level of independence in the home and community environment.     Pt's spiritual, cultural and educational needs considered and pt " agreeable to plan of care and goals.     Anticipated barriers to physical therapy: None    Goals:     Short Term Goals: 0-6 weeks   Patient to demonstrate isolated quad contraction in 2 weeks.   Patient to ambulate with a normal gait pattern in 2 weeks.   Patient to improve active knee extension to 0 degrees in 3 weeks.   Patient to be able to perform 25 SLR without an extensor lag in 4 weeks.  Patient to improve knee flexion to uninvolved side in 4-6 weeks.      Long Term Goals: 6-12 weeks   Patient to return to community ambulation without right knee pain in 6 weeks.   Patient to navigate step/curbs without right knee pain in 6-8 weeks.   Patient to be able to perform all job duties in 6-8 weeks.   Patient to return to exercise routine in 8-12 weeks without modification.     Plan     Outpatient Physical Therapy 2 times weekly for 12 weeks to include the following interventions: Electrical Stimulation IFC/TENS/NMES, Gait Training, Manual Therapy, Moist Heat/ Ice, Neuromuscular Re-ed, Patient Education, Self Care, Therapeutic Activities, Therapeutic Exercise, and Dry Needling.     Renzo Hernandez, PT , DPT  Board Certified in Sports Physical Therapy

## 2023-11-07 ENCOUNTER — CLINICAL SUPPORT (OUTPATIENT)
Dept: REHABILITATION | Facility: HOSPITAL | Age: 48
End: 2023-11-07
Payer: COMMERCIAL

## 2023-11-07 DIAGNOSIS — M94.261 CHONDROMALACIA OF RIGHT KNEE: ICD-10-CM

## 2023-11-07 DIAGNOSIS — M23.203 OLD TEAR OF MEDIAL MENISCUS OF RIGHT KNEE, UNSPECIFIED TEAR TYPE: ICD-10-CM

## 2023-11-07 DIAGNOSIS — R26.9 GAIT DISTURBANCE: ICD-10-CM

## 2023-11-07 DIAGNOSIS — M23.200 OLD TEAR OF LATERAL MENISCUS OF RIGHT KNEE, UNSPECIFIED TEAR TYPE: ICD-10-CM

## 2023-11-07 DIAGNOSIS — R29.898 WEAKNESS OF RIGHT LOWER EXTREMITY: ICD-10-CM

## 2023-11-07 DIAGNOSIS — M25.661 DECREASED RANGE OF MOTION (ROM) OF RIGHT KNEE: Primary | ICD-10-CM

## 2023-11-07 PROCEDURE — 97110 THERAPEUTIC EXERCISES: CPT

## 2023-11-07 PROCEDURE — 97112 NEUROMUSCULAR REEDUCATION: CPT

## 2023-11-07 PROCEDURE — 97140 MANUAL THERAPY 1/> REGIONS: CPT

## 2023-11-07 NOTE — PROGRESS NOTES
[facility]   Physical Therapy Treatment Note      Name: Silas [facility] Number: [id number]    Therapy Diagnosis:   Encounter Diagnoses   Name Primary?    Decreased range of motion (ROM) of right knee Yes    Weakness of right lower extremity     Gait disturbance     Old tear of medial meniscus of right knee, unspecified tear type     Old tear of lateral meniscus of right knee, unspecified tear type     Chondromalacia of right knee        Physician: Anshul Arteaga PA-C    Visit Date: 11/7/2023    Physician Orders: PT Eval and Treatment  Medical Diagnosis from Referral: Old tear of medial meniscus of the right knee (M23.203), old tear of lateral meniscus of right knee (M23.200), chondromalacia of right knee (94.261)    Evaluation Date: 9/12/2023  Authorization Period Expiration: 9/6/24  Plan of Care Expiration: 12/31/23  Date of Surgery: 9/11/23  Visit # / Visits authorized: 13 / 20 + Eval  FOTO: 1/ 3    PTA Visit #: 0/5     DATE OF PROCEDURE:  9/11/2023     PROCEDURES:   1. Right knee arthroscopic chondroplasty  2. Right knee arthroscopic partial medial and lateral meniscectomies  3. Right knee arthroscopic loose body removal  4. Extensive debridement right knee    Time In: 1400  Time Out: 1507  Total Billable Time: 55 minutes    Subjective     Pt reports: His knee is feeling good. He noted a little blood coming from his incision scar on his knee. He reports it being mostly red but maybe had some clear or slightly whitish discharge as well. He wants to make sure he does not have an infection     He was compliant with home exercise program.  Pain: Not verbalized /10  Location: right knee      Objective      Objective Measures updated at progress report unless specified.     R Knee PROM from 10/23:  Pre-Tx: 0-3-115  Post-Tx: 0-3-120    Treatment     Iván received the treatments listed below:      therapeutic exercises to develop strength, endurance, ROM, and flexibility for 20  "minutes including:  Bike completed for 6 min to increase ROM, endurance and decrease pain to improve tolerance to ADLs and age related activities.   Heel prop x10 mins 15#  Popliteal gapping hand heel rock x20     Not performed:   SL Shuttle 50# 3x15   SL Knee extension matrix 3 x 10 x 3" hold, 15#      manual therapy techniques: were applied for 15 minutes, including:  Posterior Tibial glides Grd III  R knee PROM   Hoffa's fat pad and patella mobilizations     neuromuscular re-education activities to improve: Balance, Coordination, Kinesthetic, Sense, Proprioception, and Motor Control for 25 minutes. The following activities were included:    BFR completed to improve strength/hypertrophy of quad/hamstring of R LE at 80% NWB & 60% WB. Reps 30/15/15/15  - Hinge QS w/strap  - SL Shuttle 37.5#       therapeutic activities to improve functional performance for 00 minutes, including:        Patient Education and Home Exercises       Education provided:   - Reviewed/updated HEP frequency and volume      Written Home Exercises Provided: yes. Exercises were reviewed and Iván was able to demonstrate them prior to the end of the session.  Iván demonstrated good  understanding of the education provided. See EMR under Patient Instructions for exercises provided during therapy sessions    Assessment     Continued with BFR progressions which were tolerated well. I observed the lesion the patient reported and noted a small amount of serosanguinous when I gently expressed the site and observed no signs of infection. Pt instructed to keep it clean and covered and monitor for signs of infection.         Iván Is progressing well towards his goals.   Pt prognosis is Good.     Pt will continue to benefit from skilled outpatient physical therapy to address the deficits listed in the problem list box on initial evaluation, provide pt/family education and to maximize pt's level of independence in the home and community environment. "     Pt's spiritual, cultural and educational needs considered and pt agreeable to plan of care and goals.     Anticipated barriers to physical therapy: None    Goals:     Short Term Goals: 0-6 weeks   Patient to demonstrate isolated quad contraction in 2 weeks.   Patient to ambulate with a normal gait pattern in 2 weeks.   Patient to improve active knee extension to 0 degrees in 3 weeks.   Patient to be able to perform 25 SLR without an extensor lag in 4 weeks.  Patient to improve knee flexion to uninvolved side in 4-6 weeks.      Long Term Goals: 6-12 weeks   Patient to return to community ambulation without right knee pain in 6 weeks.   Patient to navigate step/curbs without right knee pain in 6-8 weeks.   Patient to be able to perform all job duties in 6-8 weeks.   Patient to return to exercise routine in 8-12 weeks without modification.     Plan     Outpatient Physical Therapy 2 times weekly for 12 weeks to include the following interventions: Electrical Stimulation IFC/TENS/NMES, Gait Training, Manual Therapy, Moist Heat/ Ice, Neuromuscular Re-ed, Patient Education, Self Care, Therapeutic Activities, Therapeutic Exercise, and Dry Needling.     Renzo Hernandez, PT , DPT  Board Certified in Sports Physical Therapy     Unknown

## 2023-11-09 ENCOUNTER — CLINICAL SUPPORT (OUTPATIENT)
Dept: REHABILITATION | Facility: HOSPITAL | Age: 48
End: 2023-11-09
Payer: COMMERCIAL

## 2023-11-09 DIAGNOSIS — M23.200 OLD TEAR OF LATERAL MENISCUS OF RIGHT KNEE, UNSPECIFIED TEAR TYPE: ICD-10-CM

## 2023-11-09 DIAGNOSIS — R26.9 GAIT DISTURBANCE: ICD-10-CM

## 2023-11-09 DIAGNOSIS — M23.203 OLD TEAR OF MEDIAL MENISCUS OF RIGHT KNEE, UNSPECIFIED TEAR TYPE: ICD-10-CM

## 2023-11-09 DIAGNOSIS — M94.261 CHONDROMALACIA OF RIGHT KNEE: ICD-10-CM

## 2023-11-09 DIAGNOSIS — M25.661 DECREASED RANGE OF MOTION (ROM) OF RIGHT KNEE: Primary | ICD-10-CM

## 2023-11-09 DIAGNOSIS — R29.898 WEAKNESS OF RIGHT LOWER EXTREMITY: ICD-10-CM

## 2023-11-09 PROCEDURE — 97110 THERAPEUTIC EXERCISES: CPT

## 2023-11-09 PROCEDURE — 97140 MANUAL THERAPY 1/> REGIONS: CPT

## 2023-11-09 PROCEDURE — 97112 NEUROMUSCULAR REEDUCATION: CPT

## 2023-11-09 NOTE — PROGRESS NOTES
[facility]   Physical Therapy Treatment Note      Name: Silas [facility] Number: [id number]    Therapy Diagnosis:   Encounter Diagnoses   Name Primary?    Decreased range of motion (ROM) of right knee Yes    Weakness of right lower extremity     Gait disturbance     Old tear of medial meniscus of right knee, unspecified tear type     Old tear of lateral meniscus of right knee, unspecified tear type     Chondromalacia of right knee        Physician: Anshul Arteaga PA-C    Visit Date: 11/9/2023    Physician Orders: PT Eval and Treatment  Medical Diagnosis from Referral: Old tear of medial meniscus of the right knee (M23.203), old tear of lateral meniscus of right knee (M23.200), chondromalacia of right knee (94.261)    Evaluation Date: 9/12/2023  Authorization Period Expiration: 9/6/24  Plan of Care Expiration: 12/31/23  Date of Surgery: 9/11/23  Visit # / Visits authorized: 14 / 20 + Eval  FOTO: 1/ 3    PTA Visit #: 0/5     DATE OF PROCEDURE:  9/11/2023     PROCEDURES:   1. Right knee arthroscopic chondroplasty  2. Right knee arthroscopic partial medial and lateral meniscectomies  3. Right knee arthroscopic loose body removal  4. Extensive debridement right knee    Time In: 1055  Time Out: 1205  Total Billable Time: 57 minutes    Subjective     Pt reports: He had mod muscle soreness following last tx the next day. Feels like he god a workout.     He was compliant with home exercise program.  Pain: Not verbalized /10  Location: right knee      Objective      Objective Measures updated at progress report unless specified.     R Knee PROM from 11/9/23:  Pre-Tx: 0-120  Post-Tx: 0-3-120    Treatment     Iván received the treatments listed below:      therapeutic exercises to develop strength, endurance, ROM, and flexibility for 22 minutes including:  Bike completed for 6 min to increase ROM, endurance and decrease pain to improve tolerance to ADLs and age related activities.   Heel  "prop x10 mins 15#  Popliteal gapping hand heel rock x20     Not performed:   SL Shuttle 50# 3x15   SL Knee extension matrix 3 x 10 x 3" hold, 15#      manual therapy techniques: were applied for 15 minutes, including:  Posterior Tibial glides Grd III  R knee PROM   Hoffa's fat pad and patella mobilizations     neuromuscular re-education activities to improve: Balance, Coordination, Kinesthetic, Sense, Proprioception, and Motor Control for 30 minutes. The following activities were included:    BFR completed to improve strength/hypertrophy of quad/hamstring of R LE at 80% NWB & 60% WB. Reps 30/15/15/15  - Bridge  - SL Shuttle 37.5#   - SLR      therapeutic activities to improve functional performance for 00 minutes, including:        Patient Education and Home Exercises       Education provided:   - Reviewed/updated HEP frequency and volume      Written Home Exercises Provided: yes. Exercises were reviewed and Iván was able to demonstrate them prior to the end of the session.  Iván demonstrated good  understanding of the education provided. See EMR under Patient Instructions for exercises provided during therapy sessions    Assessment     Continued with BFR progressions which were tolerated well. Slow but continued improvement in ROM.         Iván Is progressing well towards his goals.   Pt prognosis is Good.     Pt will continue to benefit from skilled outpatient physical therapy to address the deficits listed in the problem list box on initial evaluation, provide pt/family education and to maximize pt's level of independence in the home and community environment.     Pt's spiritual, cultural and educational needs considered and pt agreeable to plan of care and goals.     Anticipated barriers to physical therapy: None    Goals:     Short Term Goals: 0-6 weeks   Patient to demonstrate isolated quad contraction in 2 weeks.   Patient to ambulate with a normal gait pattern in 2 weeks.   Patient to improve active knee " extension to 0 degrees in 3 weeks.   Patient to be able to perform 25 SLR without an extensor lag in 4 weeks.  Patient to improve knee flexion to uninvolved side in 4-6 weeks.      Long Term Goals: 6-12 weeks   Patient to return to community ambulation without right knee pain in 6 weeks.   Patient to navigate step/curbs without right knee pain in 6-8 weeks.   Patient to be able to perform all job duties in 6-8 weeks.   Patient to return to exercise routine in 8-12 weeks without modification.     Plan     Outpatient Physical Therapy 2 times weekly for 12 weeks to include the following interventions: Electrical Stimulation IFC/TENS/NMES, Gait Training, Manual Therapy, Moist Heat/ Ice, Neuromuscular Re-ed, Patient Education, Self Care, Therapeutic Activities, Therapeutic Exercise, and Dry Needling.     Renzo Hernandez, PT , DPT  Board Certified in Sports Physical Therapy

## 2023-11-16 ENCOUNTER — CLINICAL SUPPORT (OUTPATIENT)
Dept: REHABILITATION | Facility: HOSPITAL | Age: 48
End: 2023-11-16
Payer: COMMERCIAL

## 2023-11-16 DIAGNOSIS — R26.9 GAIT DISTURBANCE: ICD-10-CM

## 2023-11-16 DIAGNOSIS — R29.898 WEAKNESS OF RIGHT LOWER EXTREMITY: ICD-10-CM

## 2023-11-16 DIAGNOSIS — M23.200 OLD TEAR OF LATERAL MENISCUS OF RIGHT KNEE, UNSPECIFIED TEAR TYPE: ICD-10-CM

## 2023-11-16 DIAGNOSIS — M23.203 OLD TEAR OF MEDIAL MENISCUS OF RIGHT KNEE, UNSPECIFIED TEAR TYPE: ICD-10-CM

## 2023-11-16 DIAGNOSIS — M25.661 DECREASED RANGE OF MOTION (ROM) OF RIGHT KNEE: Primary | ICD-10-CM

## 2023-11-16 DIAGNOSIS — M94.261 CHONDROMALACIA OF RIGHT KNEE: ICD-10-CM

## 2023-11-16 PROCEDURE — 97140 MANUAL THERAPY 1/> REGIONS: CPT

## 2023-11-16 PROCEDURE — 97110 THERAPEUTIC EXERCISES: CPT

## 2023-11-16 PROCEDURE — 97112 NEUROMUSCULAR REEDUCATION: CPT

## 2023-11-16 NOTE — PROGRESS NOTES
[facility]   Physical Therapy Treatment Note      Name: Silas [facility] Number: [id number]    Therapy Diagnosis:   Encounter Diagnoses   Name Primary?    Decreased range of motion (ROM) of right knee Yes    Weakness of right lower extremity     Gait disturbance     Old tear of medial meniscus of right knee, unspecified tear type     Old tear of lateral meniscus of right knee, unspecified tear type     Chondromalacia of right knee        Physician: Anshul Arteaga PA-C    Visit Date: 11/16/2023    Physician Orders: PT Eval and Treatment  Medical Diagnosis from Referral: Old tear of medial meniscus of the right knee (M23.203), old tear of lateral meniscus of right knee (M23.200), chondromalacia of right knee (94.261)    Evaluation Date: 9/12/2023  Authorization Period Expiration: 9/6/24  Plan of Care Expiration: 12/31/23  Date of Surgery: 9/11/23  Visit # / Visits authorized: 15 / 20 + Eval  FOTO: 1/ 3    PTA Visit #: 0/5     DATE OF PROCEDURE:  9/11/2023     PROCEDURES:   1. Right knee arthroscopic chondroplasty  2. Right knee arthroscopic partial medial and lateral meniscectomies  3. Right knee arthroscopic loose body removal  4. Extensive debridement right knee    Time In: 1000  Time Out: 1100  Total Billable Time: 54 minutes    Subjective     Pt reports: His knee is feeling good. He continues to work on his extension.     He was compliant with home exercise program.  Pain: Not verbalized /10  Location: right knee      Objective      Objective Measures updated at progress report unless specified.     R Knee PROM from 11/9/23:  Pre-Tx: 0-120  Post-Tx: 0-3-120    Treatment     Iván received the treatments listed below:      therapeutic exercises to develop strength, endurance, ROM, and flexibility for 20 minutes including:  Bike completed for 6 min to increase ROM, endurance and decrease pain to improve tolerance to ADLs and age related activities.   Heel prop x10 mins  "15#  Popliteal gapping hand heel rock x20     Not performed:   SL Shuttle 50# 3x15   SL Knee extension matrix 3 x 10 x 3" hold, 15#      manual therapy techniques: were applied for 15 minutes, including:  Posterior Tibial glides Grd III  R knee PROM   Hoffa's fat pad and patella mobilizations     neuromuscular re-education activities to improve: Balance, Coordination, Kinesthetic, Sense, Proprioception, and Motor Control for 25 minutes. The following activities were included:    BFR completed to improve strength/hypertrophy of quad/hamstring of R LE at 80% NWB & 60% WB. Reps 30/15/15/15  - TKE Red CF Band   - SL Shuttle 37.5#   - SLR      therapeutic activities to improve functional performance for 00 minutes, including:        Patient Education and Home Exercises       Education provided:   - Reviewed/updated HEP frequency and volume      Written Home Exercises Provided: yes. Exercises were reviewed and Iván was able to demonstrate them prior to the end of the session.  Iván demonstrated good  understanding of the education provided. See EMR under Patient Instructions for exercises provided during therapy sessions    Assessment     Patient has recently had slower progress in gain of passive knee extension. We will continue to work on this with emphasis on quad strengthening through full range. Continues to tolerate BFR well and is challenging.         Iván Is progressing well towards his goals.   Pt prognosis is Good.     Pt will continue to benefit from skilled outpatient physical therapy to address the deficits listed in the problem list box on initial evaluation, provide pt/family education and to maximize pt's level of independence in the home and community environment.     Pt's spiritual, cultural and educational needs considered and pt agreeable to plan of care and goals.     Anticipated barriers to physical therapy: None    Goals:     Short Term Goals: 0-6 weeks   Patient to demonstrate isolated quad " contraction in 2 weeks.   Patient to ambulate with a normal gait pattern in 2 weeks.   Patient to improve active knee extension to 0 degrees in 3 weeks.   Patient to be able to perform 25 SLR without an extensor lag in 4 weeks.  Patient to improve knee flexion to uninvolved side in 4-6 weeks.      Long Term Goals: 6-12 weeks   Patient to return to community ambulation without right knee pain in 6 weeks.   Patient to navigate step/curbs without right knee pain in 6-8 weeks.   Patient to be able to perform all job duties in 6-8 weeks.   Patient to return to exercise routine in 8-12 weeks without modification.     Plan     Outpatient Physical Therapy 2 times weekly for 12 weeks to include the following interventions: Electrical Stimulation IFC/TENS/NMES, Gait Training, Manual Therapy, Moist Heat/ Ice, Neuromuscular Re-ed, Patient Education, Self Care, Therapeutic Activities, Therapeutic Exercise, and Dry Needling.     Renzo Hernandez, PT , DPT  Board Certified in Sports Physical Therapy

## 2023-11-20 ENCOUNTER — CLINICAL SUPPORT (OUTPATIENT)
Dept: REHABILITATION | Facility: HOSPITAL | Age: 48
End: 2023-11-20
Payer: COMMERCIAL

## 2023-11-20 DIAGNOSIS — R29.898 WEAKNESS OF RIGHT LOWER EXTREMITY: ICD-10-CM

## 2023-11-20 DIAGNOSIS — M25.661 DECREASED RANGE OF MOTION (ROM) OF RIGHT KNEE: Primary | ICD-10-CM

## 2023-11-20 DIAGNOSIS — M23.200 OLD TEAR OF LATERAL MENISCUS OF RIGHT KNEE, UNSPECIFIED TEAR TYPE: ICD-10-CM

## 2023-11-20 DIAGNOSIS — M23.203 OLD TEAR OF MEDIAL MENISCUS OF RIGHT KNEE, UNSPECIFIED TEAR TYPE: ICD-10-CM

## 2023-11-20 DIAGNOSIS — M94.261 CHONDROMALACIA OF RIGHT KNEE: ICD-10-CM

## 2023-11-20 DIAGNOSIS — R26.9 GAIT DISTURBANCE: ICD-10-CM

## 2023-11-20 PROCEDURE — 97112 NEUROMUSCULAR REEDUCATION: CPT

## 2023-11-20 PROCEDURE — 97110 THERAPEUTIC EXERCISES: CPT

## 2023-11-20 NOTE — PROGRESS NOTES
[facility]   Physical Therapy Treatment Note      Name: Silas [facility] Number: [id number]    Therapy Diagnosis:   Encounter Diagnoses   Name Primary?    Decreased range of motion (ROM) of right knee Yes    Weakness of right lower extremity     Gait disturbance     Old tear of medial meniscus of right knee, unspecified tear type     Old tear of lateral meniscus of right knee, unspecified tear type     Chondromalacia of right knee        Physician: Anshul Arteaga PA-C    Visit Date: 11/20/2023    Physician Orders: PT Eval and Treatment  Medical Diagnosis from Referral: Old tear of medial meniscus of the right knee (M23.203), old tear of lateral meniscus of right knee (M23.200), chondromalacia of right knee (94.261)    Evaluation Date: 9/12/2023  Authorization Period Expiration: 9/6/24  Plan of Care Expiration: 12/31/23  Date of Surgery: 9/11/23  Visit # / Visits authorized: 16 / 20 + Eval  FOTO: 1/ 3    PTA Visit #: 0/5     DATE OF PROCEDURE:  9/11/2023     PROCEDURES:   1. Right knee arthroscopic chondroplasty  2. Right knee arthroscopic partial medial and lateral meniscectomies  3. Right knee arthroscopic loose body removal  4. Extensive debridement right knee    Time In: 1102  Time Out: 1200  Total Billable Time: 55 minutes    Subjective     Pt reports: His knee is feeling good. He continues to work on his extension.     He was compliant with home exercise program.  Pain: Not verbalized /10  Location: right knee      Objective      Objective Measures updated at progress report unless specified.     R Knee PROM from 11/9/23:  Pre-Tx: 0-125  Post-Tx: 0-130    Treatment     Iván received the treatments listed below:      therapeutic exercises to develop strength, endurance, ROM, and flexibility for 23 minutes including:  Bike completed for 8 min to increase ROM, endurance and decrease pain to improve tolerance to ADLs and age related activities.   Heel prop x10 mins  "15#  Popliteal gapping hand heel rock x20     Not performed:   SL Shuttle 50# 3x15   SL Knee extension matrix 3 x 10 x 3" hold, 15#      manual therapy techniques: were applied for 00 minutes, including:    neuromuscular re-education activities to improve: Balance, Coordination, Kinesthetic, Sense, Proprioception, and Motor Control for 35 minutes. The following activities were included:    BFR completed to improve strength/hypertrophy of quad/hamstring of R LE at 80% NWB & 60% WB. Reps 30/15/15/15  - 6" Step up   - SLR  - LAQ 5#       therapeutic activities to improve functional performance for 00 minutes, including:        Patient Education and Home Exercises       Education provided:   - Reviewed/updated HEP frequency and volume      Written Home Exercises Provided: yes. Exercises were reviewed and Iván was able to demonstrate them prior to the end of the session.  Iván demonstrated good  understanding of the education provided. See EMR under Patient Instructions for exercises provided during therapy sessions    Assessment     Patient continues to slowly improve flexion ROM. Continues to tolerate BFR well.         Iván Is progressing well towards his goals.   Pt prognosis is Good.     Pt will continue to benefit from skilled outpatient physical therapy to address the deficits listed in the problem list box on initial evaluation, provide pt/family education and to maximize pt's level of independence in the home and community environment.     Pt's spiritual, cultural and educational needs considered and pt agreeable to plan of care and goals.     Anticipated barriers to physical therapy: None    Goals:     Short Term Goals: 0-6 weeks   Patient to demonstrate isolated quad contraction in 2 weeks.   Patient to ambulate with a normal gait pattern in 2 weeks.   Patient to improve active knee extension to 0 degrees in 3 weeks.   Patient to be able to perform 25 SLR without an extensor lag in 4 weeks.  Patient to " improve knee flexion to uninvolved side in 4-6 weeks.      Long Term Goals: 6-12 weeks   Patient to return to community ambulation without right knee pain in 6 weeks.   Patient to navigate step/curbs without right knee pain in 6-8 weeks.   Patient to be able to perform all job duties in 6-8 weeks.   Patient to return to exercise routine in 8-12 weeks without modification.     Plan     Outpatient Physical Therapy 2 times weekly for 12 weeks to include the following interventions: Electrical Stimulation IFC/TENS/NMES, Gait Training, Manual Therapy, Moist Heat/ Ice, Neuromuscular Re-ed, Patient Education, Self Care, Therapeutic Activities, Therapeutic Exercise, and Dry Needling.     Renzo Hernandez, PT , DPT  Board Certified in Sports Physical Therapy

## 2023-11-27 ENCOUNTER — CLINICAL SUPPORT (OUTPATIENT)
Dept: REHABILITATION | Facility: HOSPITAL | Age: 48
End: 2023-11-27
Payer: COMMERCIAL

## 2023-11-27 DIAGNOSIS — M23.203 OLD TEAR OF MEDIAL MENISCUS OF RIGHT KNEE, UNSPECIFIED TEAR TYPE: ICD-10-CM

## 2023-11-27 DIAGNOSIS — R29.898 WEAKNESS OF RIGHT LOWER EXTREMITY: ICD-10-CM

## 2023-11-27 DIAGNOSIS — M25.661 DECREASED RANGE OF MOTION (ROM) OF RIGHT KNEE: Primary | ICD-10-CM

## 2023-11-27 DIAGNOSIS — M23.200 OLD TEAR OF LATERAL MENISCUS OF RIGHT KNEE, UNSPECIFIED TEAR TYPE: ICD-10-CM

## 2023-11-27 DIAGNOSIS — R26.9 GAIT DISTURBANCE: ICD-10-CM

## 2023-11-27 DIAGNOSIS — M94.261 CHONDROMALACIA OF RIGHT KNEE: ICD-10-CM

## 2023-11-27 PROCEDURE — 97530 THERAPEUTIC ACTIVITIES: CPT

## 2023-11-27 PROCEDURE — 97110 THERAPEUTIC EXERCISES: CPT

## 2023-11-27 PROCEDURE — 97112 NEUROMUSCULAR REEDUCATION: CPT

## 2023-11-27 NOTE — PROGRESS NOTES
[facility]   Physical Therapy Treatment Note      Name: Silas [facility] Number: [id number]    Therapy Diagnosis:   Encounter Diagnoses   Name Primary?    Decreased range of motion (ROM) of right knee Yes    Weakness of right lower extremity     Gait disturbance     Old tear of medial meniscus of right knee, unspecified tear type     Old tear of lateral meniscus of right knee, unspecified tear type     Chondromalacia of right knee        Physician: Anshul Arteaga PA-C    Visit Date: 11/27/2023    Physician Orders: PT Eval and Treatment  Medical Diagnosis from Referral: Old tear of medial meniscus of the right knee (M23.203), old tear of lateral meniscus of right knee (M23.200), chondromalacia of right knee (94.261)    Evaluation Date: 9/12/2023  Authorization Period Expiration: 9/6/24  Plan of Care Expiration: 12/31/23  Date of Surgery: 9/11/23  Visit # / Visits authorized: 17 / 20 + Eval  FOTO: 1/ 3    PTA Visit #: 0/5     DATE OF PROCEDURE:  9/11/2023     PROCEDURES:   1. Right knee arthroscopic chondroplasty  2. Right knee arthroscopic partial medial and lateral meniscectomies  3. Right knee arthroscopic loose body removal  4. Extensive debridement right knee    Time In: 1055  Time Out: 1100  Total Billable Time: 57 minutes    Subjective     Pt reports: Knee felt a little stiff in the back with flexion last week. He does feel like he continues to get stronger.     He was compliant with home exercise program.  Pain: Not verbalized /10  Location: right knee      Objective      Objective Measures updated at progress report unless specified.     R Knee PROM from 11/9/23:  Pre-Tx: 0-125  Post-Tx: 0-130    Treatment     Iván received the treatments listed below:      therapeutic exercises to develop strength, endurance, ROM, and flexibility for 15 minutes including:  Heel prop x10 mins 20#  Popliteal gapping hand heel rock x20     Not performed:   SL Shuttle 50# 3x15   SL Knee  "extension matrix 3 x 10 x 3" hold, 15#      manual therapy techniques: were applied for 6 minutes, including:  Popliteal gapping MWM   Posterior tibial glide Grd III-IV    neuromuscular re-education activities to improve: Balance, Coordination, Kinesthetic, Sense, Proprioception, and Motor Control for 34 minutes. The following activities were included:    BFR completed to improve strength/hypertrophy of quad/hamstring of R LE at 80% NWB & 60% WB. Reps 30/15/15/15  - TKE Red CF Band   - SLR  - SL Leg Press 80#       therapeutic activities to improve functional performance for 10 minutes, including:  Retro Sled Pull 90# <-> LOT 3x      Patient Education and Home Exercises       Education provided:   - Reviewed/updated HEP frequency and volume      Written Home Exercises Provided: yes. Exercises were reviewed and Iván was able to demonstrate them prior to the end of the session.  Iván demonstrated good  understanding of the education provided. See EMR under Patient Instructions for exercises provided during therapy sessions    Assessment     Patient continues to slowly improve flexion ROM. Continues to tolerate BFR well. Progressed functional training emphasis on quad control with retro sled pulls. Pt initially had difficulty with transition to involved side but improved with repetition and cues for timing/sequencing.         Iván Is progressing well towards his goals.   Pt prognosis is Good.     Pt will continue to benefit from skilled outpatient physical therapy to address the deficits listed in the problem list box on initial evaluation, provide pt/family education and to maximize pt's level of independence in the home and community environment.     Pt's spiritual, cultural and educational needs considered and pt agreeable to plan of care and goals.     Anticipated barriers to physical therapy: None    Goals:     Short Term Goals: 0-6 weeks   Patient to demonstrate isolated quad contraction in 2 weeks.   Patient " to ambulate with a normal gait pattern in 2 weeks.   Patient to improve active knee extension to 0 degrees in 3 weeks.   Patient to be able to perform 25 SLR without an extensor lag in 4 weeks.  Patient to improve knee flexion to uninvolved side in 4-6 weeks.      Long Term Goals: 6-12 weeks   Patient to return to community ambulation without right knee pain in 6 weeks.   Patient to navigate step/curbs without right knee pain in 6-8 weeks.   Patient to be able to perform all job duties in 6-8 weeks.   Patient to return to exercise routine in 8-12 weeks without modification.     Plan     Outpatient Physical Therapy 2 times weekly for 12 weeks to include the following interventions: Electrical Stimulation IFC/TENS/NMES, Gait Training, Manual Therapy, Moist Heat/ Ice, Neuromuscular Re-ed, Patient Education, Self Care, Therapeutic Activities, Therapeutic Exercise, and Dry Needling.     Renzo Hernandez, PT , DPT  Board Certified in Sports Physical Therapy

## 2023-11-29 ENCOUNTER — CLINICAL SUPPORT (OUTPATIENT)
Dept: REHABILITATION | Facility: HOSPITAL | Age: 48
End: 2023-11-29
Payer: COMMERCIAL

## 2023-11-29 DIAGNOSIS — R29.898 WEAKNESS OF RIGHT LOWER EXTREMITY: ICD-10-CM

## 2023-11-29 DIAGNOSIS — M23.200 OLD TEAR OF LATERAL MENISCUS OF RIGHT KNEE, UNSPECIFIED TEAR TYPE: ICD-10-CM

## 2023-11-29 DIAGNOSIS — M94.261 CHONDROMALACIA OF RIGHT KNEE: ICD-10-CM

## 2023-11-29 DIAGNOSIS — M25.661 DECREASED RANGE OF MOTION (ROM) OF RIGHT KNEE: Primary | ICD-10-CM

## 2023-11-29 DIAGNOSIS — M23.203 OLD TEAR OF MEDIAL MENISCUS OF RIGHT KNEE, UNSPECIFIED TEAR TYPE: ICD-10-CM

## 2023-11-29 DIAGNOSIS — R26.9 GAIT DISTURBANCE: ICD-10-CM

## 2023-11-29 PROCEDURE — 97110 THERAPEUTIC EXERCISES: CPT

## 2023-11-29 PROCEDURE — 97112 NEUROMUSCULAR REEDUCATION: CPT

## 2023-11-29 PROCEDURE — 97140 MANUAL THERAPY 1/> REGIONS: CPT

## 2023-11-29 NOTE — PROGRESS NOTES
"[facility]   Physical Therapy Treatment Note      Name: Silas [facility] Number: [id number]    Therapy Diagnosis:   Encounter Diagnoses   Name Primary?    Decreased range of motion (ROM) of right knee Yes    Weakness of right lower extremity     Gait disturbance     Old tear of medial meniscus of right knee, unspecified tear type     Old tear of lateral meniscus of right knee, unspecified tear type     Chondromalacia of right knee        Physician: Anshul Arteaga PA-C    Visit Date: 11/29/2023    Physician Orders: PT Eval and Treatment  Medical Diagnosis from Referral: Old tear of medial meniscus of the right knee (M23.203), old tear of lateral meniscus of right knee (M23.200), chondromalacia of right knee (94.261)    Evaluation Date: 9/12/2023  Authorization Period Expiration: 9/6/24  Plan of Care Expiration: 12/31/23  Date of Surgery: 9/11/23  Visit # / Visits authorized: 18 / 20 + Eval  FOTO: 1/ 3    PTA Visit #: 0/5     DATE OF PROCEDURE:  9/11/2023     PROCEDURES:   1. Right knee arthroscopic chondroplasty  2. Right knee arthroscopic partial medial and lateral meniscectomies  3. Right knee arthroscopic loose body removal  4. Extensive debridement right knee    Time In: 1230  Time Out: 1330  Total Billable Time: 56 minutes    Subjective     Pt reports: He is getting a good workout from his BFR sessions. Knee is feeling stronger.     He was compliant with home exercise program.  Pain: Not verbalized /10  Location: right knee      Objective      Objective Measures updated at progress report unless specified.     R Knee PROM from 11/9/23:  Pre-Tx: 0-125  Post-Tx: 0-130    Treatment     Iván received the treatments listed below:      therapeutic exercises to develop strength, endurance, ROM, and flexibility for 15 minutes including:  Heel prop x10 mins 20#  Popliteal gapping hand heel rock x20     Not performed:   SL Shuttle 50# 3x15   SL Knee extension matrix 3 x 10 x 3" " hold, 15#      manual therapy techniques: were applied for 8 minutes, including:  Popliteal gapping MWM   Posterior tibial glide Grd III-IV    neuromuscular re-education activities to improve: Balance, Coordination, Kinesthetic, Sense, Proprioception, and Motor Control for 34 minutes. The following activities were included:    BFR completed to improve strength/hypertrophy of quad/hamstring of R LE at 80% NWB & 60% WB. Reps 30/15/15/15  - SL Bridge 20# MB   - SL Leg Press 80#   - LAQ 5#      therapeutic activities to improve functional performance for 00 minutes, including:  Retro Sled Pull 90# <-> LOT 3x - NP      Patient Education and Home Exercises       Education provided:   - Reviewed/updated HEP frequency and volume      Written Home Exercises Provided: yes. Exercises were reviewed and Iván was able to demonstrate them prior to the end of the session.  Iván demonstrated good  understanding of the education provided. See EMR under Patient Instructions for exercises provided during therapy sessions    Assessment     Patient continues to slowly improve flexion ROM. Continues to tolerate BFR well. Will look to transition to more traditional strengthening in 2 weeks following 12 week follow up with physician. .         Iván Is progressing well towards his goals.   Pt prognosis is Good.     Pt will continue to benefit from skilled outpatient physical therapy to address the deficits listed in the problem list box on initial evaluation, provide pt/family education and to maximize pt's level of independence in the home and community environment.     Pt's spiritual, cultural and educational needs considered and pt agreeable to plan of care and goals.     Anticipated barriers to physical therapy: None    Goals:     Short Term Goals: 0-6 weeks   Patient to demonstrate isolated quad contraction in 2 weeks.   Patient to ambulate with a normal gait pattern in 2 weeks.   Patient to improve active knee extension to 0 degrees  in 3 weeks.   Patient to be able to perform 25 SLR without an extensor lag in 4 weeks.  Patient to improve knee flexion to uninvolved side in 4-6 weeks.      Long Term Goals: 6-12 weeks   Patient to return to community ambulation without right knee pain in 6 weeks.   Patient to navigate step/curbs without right knee pain in 6-8 weeks.   Patient to be able to perform all job duties in 6-8 weeks.   Patient to return to exercise routine in 8-12 weeks without modification.     Plan     Outpatient Physical Therapy 2 times weekly for 12 weeks to include the following interventions: Electrical Stimulation IFC/TENS/NMES, Gait Training, Manual Therapy, Moist Heat/ Ice, Neuromuscular Re-ed, Patient Education, Self Care, Therapeutic Activities, Therapeutic Exercise, and Dry Needling.     Renzo Hernandez, PT , DPT  Board Certified in Sports Physical Therapy

## 2023-12-07 ENCOUNTER — CLINICAL SUPPORT (OUTPATIENT)
Dept: REHABILITATION | Facility: HOSPITAL | Age: 48
End: 2023-12-07
Payer: COMMERCIAL

## 2023-12-07 DIAGNOSIS — M94.261 CHONDROMALACIA OF RIGHT KNEE: ICD-10-CM

## 2023-12-07 DIAGNOSIS — M23.200 OLD TEAR OF LATERAL MENISCUS OF RIGHT KNEE, UNSPECIFIED TEAR TYPE: ICD-10-CM

## 2023-12-07 DIAGNOSIS — M23.203 OLD TEAR OF MEDIAL MENISCUS OF RIGHT KNEE, UNSPECIFIED TEAR TYPE: ICD-10-CM

## 2023-12-07 DIAGNOSIS — R29.898 WEAKNESS OF RIGHT LOWER EXTREMITY: ICD-10-CM

## 2023-12-07 DIAGNOSIS — R26.9 GAIT DISTURBANCE: ICD-10-CM

## 2023-12-07 DIAGNOSIS — M25.661 DECREASED RANGE OF MOTION (ROM) OF RIGHT KNEE: Primary | ICD-10-CM

## 2023-12-07 PROCEDURE — 97112 NEUROMUSCULAR REEDUCATION: CPT

## 2023-12-07 PROCEDURE — 97140 MANUAL THERAPY 1/> REGIONS: CPT

## 2023-12-07 PROCEDURE — 97110 THERAPEUTIC EXERCISES: CPT

## 2023-12-07 NOTE — PROGRESS NOTES
[facility]   Physical Therapy Treatment Note      Name: Silas [facility] Number: [id number]    Therapy Diagnosis:   Encounter Diagnoses   Name Primary?    Decreased range of motion (ROM) of right knee Yes    Weakness of right lower extremity     Gait disturbance     Old tear of medial meniscus of right knee, unspecified tear type     Old tear of lateral meniscus of right knee, unspecified tear type     Chondromalacia of right knee        Physician: Anshul Arteaga PA-C    Visit Date: 12/7/2023    Physician Orders: PT Eval and Treatment  Medical Diagnosis from Referral: Old tear of medial meniscus of the right knee (M23.203), old tear of lateral meniscus of right knee (M23.200), chondromalacia of right knee (94.261)    Evaluation Date: 9/12/2023  Authorization Period Expiration: 9/6/24  Plan of Care Expiration: 12/31/23  Date of Surgery: 9/11/23  Visit # / Visits authorized: 19 / 30 + Eval  FOTO: 1/ 3    PTA Visit #: 0/5     DATE OF PROCEDURE:  9/11/2023     PROCEDURES:   1. Right knee arthroscopic chondroplasty  2. Right knee arthroscopic partial medial and lateral meniscectomies  3. Right knee arthroscopic loose body removal  4. Extensive debridement right knee    Time In: 1005  Time Out: 1100  Total Billable Time: 55 minutes    Subjective     Pt reports: His knee is feeling the best it has so far. He is surprised how much extension he has.      He was compliant with home exercise program.  Pain: Not verbalized /10  Location: right knee      Objective      Objective Measures updated at progress report unless specified.     R Knee PROM from 11/9/23:  Pre-Tx: 0-125  Post-Tx: 0-130    Treatment     Iván received the treatments listed below:      therapeutic exercises to develop strength, endurance, ROM, and flexibility for 15 minutes including:  Heel prop x10 mins 20#  Popliteal gapping hand heel rock x20     Not performed:   SL Shuttle 50# 3x15   SL Knee extension matrix 3 x 10  "x 3" hold, 15#      manual therapy techniques: were applied for 8 minutes, including:  Popliteal gapping MWM   Posterior tibial glide Grd III-IV    neuromuscular re-education activities to improve: Balance, Coordination, Kinesthetic, Sense, Proprioception, and Motor Control for 34 minutes. The following activities were included:    BFR completed to improve strength/hypertrophy of quad/hamstring of R LE at 80% NWB & 60% WB. Reps 30/15/15/15  - Bridge 20# MB   - 6" Step up 20#  - LAQ 5#      therapeutic activities to improve functional performance for 00 minutes, including:  Retro Sled Pull 90# <-> LOT 3x - NP      Patient Education and Home Exercises       Education provided:   - Reviewed/updated HEP frequency and volume      Written Home Exercises Provided: yes. Exercises were reviewed and Iván was able to demonstrate them prior to the end of the session.  Iván demonstrated good  understanding of the education provided. See EMR under Patient Instructions for exercises provided during therapy sessions    Assessment     Patient continues to slowly improve flexion ROM. Continues to tolerate BFR well. Will look to transition to more traditional strengthening in 2 weeks following 12 week follow up with physician. .         Iván Is progressing well towards his goals.   Pt prognosis is Good.     Pt will continue to benefit from skilled outpatient physical therapy to address the deficits listed in the problem list box on initial evaluation, provide pt/family education and to maximize pt's level of independence in the home and community environment.     Pt's spiritual, cultural and educational needs considered and pt agreeable to plan of care and goals.     Anticipated barriers to physical therapy: None    Goals:     Short Term Goals: 0-6 weeks   Patient to demonstrate isolated quad contraction in 2 weeks.   Patient to ambulate with a normal gait pattern in 2 weeks.   Patient to improve active knee extension to 0 degrees " in 3 weeks.   Patient to be able to perform 25 SLR without an extensor lag in 4 weeks.  Patient to improve knee flexion to uninvolved side in 4-6 weeks.      Long Term Goals: 6-12 weeks   Patient to return to community ambulation without right knee pain in 6 weeks.   Patient to navigate step/curbs without right knee pain in 6-8 weeks.   Patient to be able to perform all job duties in 6-8 weeks.   Patient to return to exercise routine in 8-12 weeks without modification.     Plan     Outpatient Physical Therapy 2 times weekly for 12 weeks to include the following interventions: Electrical Stimulation IFC/TENS/NMES, Gait Training, Manual Therapy, Moist Heat/ Ice, Neuromuscular Re-ed, Patient Education, Self Care, Therapeutic Activities, Therapeutic Exercise, and Dry Needling.     Renzo Hernandez, PT , DPT  Board Certified in Sports Physical Therapy

## 2023-12-12 ENCOUNTER — CLINICAL SUPPORT (OUTPATIENT)
Dept: REHABILITATION | Facility: HOSPITAL | Age: 48
End: 2023-12-12
Payer: COMMERCIAL

## 2023-12-12 DIAGNOSIS — M23.200 OLD TEAR OF LATERAL MENISCUS OF RIGHT KNEE, UNSPECIFIED TEAR TYPE: ICD-10-CM

## 2023-12-12 DIAGNOSIS — R26.9 GAIT DISTURBANCE: ICD-10-CM

## 2023-12-12 DIAGNOSIS — M94.261 CHONDROMALACIA OF RIGHT KNEE: ICD-10-CM

## 2023-12-12 DIAGNOSIS — M25.661 DECREASED RANGE OF MOTION (ROM) OF RIGHT KNEE: Primary | ICD-10-CM

## 2023-12-12 DIAGNOSIS — M23.203 OLD TEAR OF MEDIAL MENISCUS OF RIGHT KNEE, UNSPECIFIED TEAR TYPE: ICD-10-CM

## 2023-12-12 DIAGNOSIS — R29.898 WEAKNESS OF RIGHT LOWER EXTREMITY: ICD-10-CM

## 2023-12-12 PROCEDURE — 97110 THERAPEUTIC EXERCISES: CPT

## 2023-12-12 PROCEDURE — 97112 NEUROMUSCULAR REEDUCATION: CPT

## 2023-12-12 NOTE — PROGRESS NOTES
"[facility]   Physical Therapy Treatment Note      Name: Silas Stallings  [facility] Number: [id number]    Therapy Diagnosis:   Encounter Diagnoses   Name Primary?    Decreased range of motion (ROM) of right knee Yes    Weakness of right lower extremity     Gait disturbance     Old tear of medial meniscus of right knee, unspecified tear type     Old tear of lateral meniscus of right knee, unspecified tear type     Chondromalacia of right knee        Physician: Anshul Arteaga PA-C    Visit Date: 12/12/2023    Physician Orders: PT Eval and Treatment  Medical Diagnosis from Referral: Old tear of medial meniscus of the right knee (M23.203), old tear of lateral meniscus of right knee (M23.200), chondromalacia of right knee (94.261)    Evaluation Date: 9/12/2023  Authorization Period Expiration: 9/6/24  Plan of Care Expiration: 12/31/23  Date of Surgery: 9/11/23  Visit # / Visits authorized: 20 / 30 + Eval  FOTO: 1/ 3    PTA Visit #: 0/5     DATE OF PROCEDURE:  9/11/2023     PROCEDURES:   1. Right knee arthroscopic chondroplasty  2. Right knee arthroscopic partial medial and lateral meniscectomies  3. Right knee arthroscopic loose body removal  4. Extensive debridement right knee    Time In: 0900  Time Out: 0958  Total Billable Time: 54 minutes    Subjective     Pt reports: Knee feels a little stiff this morning     He was compliant with home exercise program.  Pain: Not verbalized /10  Location: right knee      Objective      Objective Measures updated at progress report unless specified.     R Knee PROM from 11/9/23:  Pre-Tx: 0-125  Post-Tx: 0-130    Treatment     Iván received the treatments listed below:      therapeutic exercises to develop strength, endurance, ROM, and flexibility for 24 minutes including:  Heel prop x10 mins 20#  Popliteal gapping hand heel rock x20   Front Plank 3x30"   BK side plank w/leg lift 3x20"     Not performed:   SL Shuttle 50# 3x15   SL Knee extension matrix 3 " "x 10 x 3" hold, 15#      manual therapy techniques: were applied for 00 minutes, including:      neuromuscular re-education activities to improve: Balance, Coordination, Kinesthetic, Sense, Proprioception, and Motor Control for 34 minutes. The following activities were included:    BFR completed to improve strength/hypertrophy of quad/hamstring of R LE at 80% NWB & 60% WB. Reps 30/15/15/15  - Bridge 20# MB   - Air Squat   - LAQ 5#      therapeutic activities to improve functional performance for 00 minutes, including:  Retro Sled Pull 90# <-> LOT 3x - NP      Patient Education and Home Exercises       Education provided:   - Reviewed/updated HEP frequency and volume      Written Home Exercises Provided: yes. Exercises were reviewed and Iván was able to demonstrate them prior to the end of the session.  Iván demonstrated good  understanding of the education provided. See EMR under Patient Instructions for exercises provided during therapy sessions    Assessment     Pt presents with 5 deg loss of extension that improved with LLLD extension. Progressed hip/trunk strengthening which was challenging.         Iván Is progressing well towards his goals.   Pt prognosis is Good.     Pt will continue to benefit from skilled outpatient physical therapy to address the deficits listed in the problem list box on initial evaluation, provide pt/family education and to maximize pt's level of independence in the home and community environment.     Pt's spiritual, cultural and educational needs considered and pt agreeable to plan of care and goals.     Anticipated barriers to physical therapy: None    Goals:     Short Term Goals: 0-6 weeks   Patient to demonstrate isolated quad contraction in 2 weeks.   Patient to ambulate with a normal gait pattern in 2 weeks.   Patient to improve active knee extension to 0 degrees in 3 weeks.   Patient to be able to perform 25 SLR without an extensor lag in 4 weeks.  Patient to improve knee " flexion to uninvolved side in 4-6 weeks.      Long Term Goals: 6-12 weeks   Patient to return to community ambulation without right knee pain in 6 weeks.   Patient to navigate step/curbs without right knee pain in 6-8 weeks.   Patient to be able to perform all job duties in 6-8 weeks.   Patient to return to exercise routine in 8-12 weeks without modification.     Plan     Outpatient Physical Therapy 2 times weekly for 12 weeks to include the following interventions: Electrical Stimulation IFC/TENS/NMES, Gait Training, Manual Therapy, Moist Heat/ Ice, Neuromuscular Re-ed, Patient Education, Self Care, Therapeutic Activities, Therapeutic Exercise, and Dry Needling.     Renzo Hernandez, PT , DPT  Board Certified in Sports Physical Therapy

## 2023-12-14 ENCOUNTER — OFFICE VISIT (OUTPATIENT)
Dept: SPORTS MEDICINE | Facility: CLINIC | Age: 48
End: 2023-12-14
Payer: COMMERCIAL

## 2023-12-14 ENCOUNTER — CLINICAL SUPPORT (OUTPATIENT)
Dept: REHABILITATION | Facility: HOSPITAL | Age: 48
End: 2023-12-14
Payer: COMMERCIAL

## 2023-12-14 VITALS
SYSTOLIC BLOOD PRESSURE: 139 MMHG | DIASTOLIC BLOOD PRESSURE: 88 MMHG | HEIGHT: 70 IN | HEART RATE: 75 BPM | BODY MASS INDEX: 25.6 KG/M2 | WEIGHT: 178.81 LBS

## 2023-12-14 DIAGNOSIS — M17.0 BILATERAL PRIMARY OSTEOARTHRITIS OF KNEE: Primary | ICD-10-CM

## 2023-12-14 DIAGNOSIS — M94.261 CHONDROMALACIA OF RIGHT KNEE: ICD-10-CM

## 2023-12-14 DIAGNOSIS — M23.203 OLD TEAR OF MEDIAL MENISCUS OF RIGHT KNEE, UNSPECIFIED TEAR TYPE: ICD-10-CM

## 2023-12-14 DIAGNOSIS — R26.9 GAIT DISTURBANCE: ICD-10-CM

## 2023-12-14 DIAGNOSIS — M25.661 DECREASED RANGE OF MOTION (ROM) OF RIGHT KNEE: Primary | ICD-10-CM

## 2023-12-14 DIAGNOSIS — R29.898 WEAKNESS OF RIGHT LOWER EXTREMITY: ICD-10-CM

## 2023-12-14 DIAGNOSIS — M23.200 OLD TEAR OF LATERAL MENISCUS OF RIGHT KNEE, UNSPECIFIED TEAR TYPE: ICD-10-CM

## 2023-12-14 PROCEDURE — 99024 PR POST-OP FOLLOW-UP VISIT: ICD-10-PCS | Mod: S$GLB,,, | Performed by: ORTHOPAEDIC SURGERY

## 2023-12-14 PROCEDURE — 99999 PR PBB SHADOW E&M-EST. PATIENT-LVL III: ICD-10-PCS | Mod: PBBFAC,,, | Performed by: ORTHOPAEDIC SURGERY

## 2023-12-14 PROCEDURE — 97110 THERAPEUTIC EXERCISES: CPT

## 2023-12-14 PROCEDURE — 97530 THERAPEUTIC ACTIVITIES: CPT

## 2023-12-14 PROCEDURE — 99024 POSTOP FOLLOW-UP VISIT: CPT | Mod: S$GLB,,, | Performed by: ORTHOPAEDIC SURGERY

## 2023-12-14 PROCEDURE — 99999 PR PBB SHADOW E&M-EST. PATIENT-LVL III: CPT | Mod: PBBFAC,,, | Performed by: ORTHOPAEDIC SURGERY

## 2023-12-14 NOTE — PROGRESS NOTES
"CC: Right knee scope post op 3 months    Patient is here for his 3 months post op appointment s/p below and is doing well. Patient is doing PT at [facility] and is progressing as expected. Reports taking mobic everyday, pain has improved overall.   DATE OF PROCEDURE:  9/11/2023      PREOPERATIVE DIAGNOSES:   1. Right knee chondromalacia  2. Right knee medial meniscus tear.   3. Right knee lateral meniscus tear  4. Right knee loose bodies  5. Extensive scar tissue, right knee     POSTOPERATIVE DIAGNOSES:   1. Right knee chondromalacia  2. Right knee medial meniscus tear.   3. Right knee lateral meniscus tear  4. Right knee loose bodies  5. Extensive scar tissue, right knee (complex)     PROCEDURES:   1. Right knee arthroscopic chondroplasty  2. Right knee arthroscopic partial medial and lateral meniscectomies  3. Right knee arthroscopic loose body removal  4. Extensive debridement right knee     SURGEON: Ricky Menjivar M.D.     PE:    /88   Pulse 75   Ht 5' 10" (1.778 m)   Wt 81.1 kg (178 lb 12.7 oz)   BMI 25.65 kg/m²      Right knee:    Medial arthroscopy portal well healed.  Lateral arthroscopy portal with mild erythema and damp the appearance with a foul odor.  No appreciated warmth, fluctuance, or expressible discharge.  Mild swelling  Compartments soft  Neurovascular status intact in extremity    AROM 5 to 120 degrees  Decreased quad strength      Assessment:  3 Month s/p right knee arthroscopic chondroplasty, partial medial lateral meniscectomies, loose body removal, and extensive debridement    Plan:  1.  Continue PT/HEP    2. Cleared for return to work on 12/20/23    3. Continue dynasplint    4. Visco injection, Prior auth submitted       All questions were answered. Instructed patient to call with questions or concerns in the interim.     "

## 2023-12-14 NOTE — LETTER
Patient: Silas Stallings   YOB: 1975   Clinic Number: [id number]   Today's Date: December 14, 2023        Certificate to Return to Work     Silas George was seen by Ricky Menjivar MD on 12/14/2023.    Silas George can return to work on 12/20/23 with full duty.    Specific restrictions: None    If you have any questions or concerns, please feel free to contact the office at [phone].    Thank you,    Ricky Menjivar MD

## 2023-12-14 NOTE — PROGRESS NOTES
[facility]   Physical Therapy Treatment Note      Name: Silas [facility] Number: [id number]    Therapy Diagnosis:   Encounter Diagnoses   Name Primary?    Decreased range of motion (ROM) of right knee Yes    Weakness of right lower extremity     Gait disturbance     Old tear of medial meniscus of right knee, unspecified tear type     Old tear of lateral meniscus of right knee, unspecified tear type     Chondromalacia of right knee        Physician: Anshul Arteaga PA-C    Visit Date: 12/14/2023    Physician Orders: PT Eval and Treatment  Medical Diagnosis from Referral: Old tear of medial meniscus of the right knee (M23.203), old tear of lateral meniscus of right knee (M23.200), chondromalacia of right knee (94.261)    Evaluation Date: 9/12/2023  Authorization Period Expiration: 9/6/24  Plan of Care Expiration: 12/31/23  Date of Surgery: 9/11/23  Visit # / Visits authorized: 21 / 30 + Eval  FOTO: 1/ 3    PTA Visit #: 0/5     DATE OF PROCEDURE:  9/11/2023     PROCEDURES:   1. Right knee arthroscopic chondroplasty  2. Right knee arthroscopic partial medial and lateral meniscectomies  3. Right knee arthroscopic loose body removal  4. Extensive debridement right knee    Time In: 0900  Time Out: 0959  Total Billable Time: 59 minutes    Subjective     Pt reports: Knee feels really good    He was compliant with home exercise program.  Pain: Not verbalized /10  Location: right knee      Objective      Objective Measures updated at progress report unless specified.     R Knee PROM from 12/14/23:  Pre-Tx: 0-130    Knee Extension iso at 60° (kg)                 Right Left               1 32.1 37.9               2 33.2 33.8               3 35.8 41.2                                   Avg 33.7 37.6                 112% LSI L Involved     11.7% % DIFF L Involved     90% LSI R Involved     -10.5% % DIFF R Involved                       Knee Flexion iso at 60° (kg)                 Right Left      "          1 20.8 28.5               2 21 28.4               3 22.8 31.3                                   Avg 21.5 29.4                 137% LSI L Involved   37% % DIFF L Involved     76% LSI R Involved   -24% % DIFF R Involved         Treatment     Iván received the treatments listed below:      therapeutic exercises to develop strength, endurance, ROM, and flexibility for 49 minutes including:  Heel prop x8 mins 20#  Bike completed for 8 min to increase ROM, endurance and decrease pain to improve tolerance to ADLs and age related activities.   Red CF Band TKE 2x15x5"    Objective strength testing with results as above       Not performed:   SL Shuttle 50# 3x15   SL Knee extension matrix 3 x 10 x 3" hold, 15#    Popliteal gapping hand heel rock x20   Front Plank 3x30"   BK side plank w/leg lift 3x20"     manual therapy techniques: were applied for 00 minutes, including:      neuromuscular re-education activities to improve: Balance, Coordination, Kinesthetic, Sense, Proprioception, and Motor Control for 00 minutes. The following activities were included:        therapeutic activities to improve functional performance for 10 minutes, including:  Sled Push/Pull 70# LOT 2x       Patient Education and Home Exercises       Education provided:   - Reviewed/updated HEP frequency and volume      Written Home Exercises Provided: yes. Exercises were reviewed and Iván was able to demonstrate them prior to the end of the session.  Iván demonstrated good  understanding of the education provided. See EMR under Patient Instructions for exercises provided during therapy sessions    Assessment     Pt presents with good carry over of extension and flexion ROM continues to progress well. Objective HS/quad strength testing shows excellent progress. At this time, I believe Iván is appropriate to transition toward more traditional strengthening programming with emphasis on HEP. This was discussed with him, to which her agreed this " was appropriate and he felt comfortable. Will initiate updated programming over coming appointments and likely decrease frequency.     Iván Is progressing well towards his goals.   Pt prognosis is Good.     Pt will continue to benefit from skilled outpatient physical therapy to address the deficits listed in the problem list box on initial evaluation, provide pt/family education and to maximize pt's level of independence in the home and community environment.     Pt's spiritual, cultural and educational needs considered and pt agreeable to plan of care and goals.     Anticipated barriers to physical therapy: None    Goals:     Short Term Goals: 0-6 weeks   Patient to demonstrate isolated quad contraction in 2 weeks.   Patient to ambulate with a normal gait pattern in 2 weeks.   Patient to improve active knee extension to 0 degrees in 3 weeks.   Patient to be able to perform 25 SLR without an extensor lag in 4 weeks.  Patient to improve knee flexion to uninvolved side in 4-6 weeks.      Long Term Goals: 6-12 weeks   Patient to return to community ambulation without right knee pain in 6 weeks.   Patient to navigate step/curbs without right knee pain in 6-8 weeks.   Patient to be able to perform all job duties in 6-8 weeks.   Patient to return to exercise routine in 8-12 weeks without modification.     Plan     Outpatient Physical Therapy 2 times weekly for 12 weeks to include the following interventions: Electrical Stimulation IFC/TENS/NMES, Gait Training, Manual Therapy, Moist Heat/ Ice, Neuromuscular Re-ed, Patient Education, Self Care, Therapeutic Activities, Therapeutic Exercise, and Dry Needling.     Renzo Hernandez, PT , DPT  Board Certified in Sports Physical Therapy

## 2023-12-18 ENCOUNTER — CLINICAL SUPPORT (OUTPATIENT)
Dept: REHABILITATION | Facility: HOSPITAL | Age: 48
End: 2023-12-18
Payer: COMMERCIAL

## 2023-12-18 DIAGNOSIS — M25.661 DECREASED RANGE OF MOTION (ROM) OF RIGHT KNEE: Primary | ICD-10-CM

## 2023-12-18 DIAGNOSIS — M94.261 CHONDROMALACIA OF RIGHT KNEE: ICD-10-CM

## 2023-12-18 DIAGNOSIS — M23.203 OLD TEAR OF MEDIAL MENISCUS OF RIGHT KNEE, UNSPECIFIED TEAR TYPE: ICD-10-CM

## 2023-12-18 DIAGNOSIS — R29.898 WEAKNESS OF RIGHT LOWER EXTREMITY: ICD-10-CM

## 2023-12-18 DIAGNOSIS — M23.200 OLD TEAR OF LATERAL MENISCUS OF RIGHT KNEE, UNSPECIFIED TEAR TYPE: ICD-10-CM

## 2023-12-18 DIAGNOSIS — R26.9 GAIT DISTURBANCE: ICD-10-CM

## 2023-12-18 PROCEDURE — 97530 THERAPEUTIC ACTIVITIES: CPT

## 2023-12-18 PROCEDURE — 97112 NEUROMUSCULAR REEDUCATION: CPT

## 2023-12-18 PROCEDURE — 97110 THERAPEUTIC EXERCISES: CPT

## 2023-12-18 NOTE — PROGRESS NOTES
[facility]   Physical Therapy Treatment Note      Name: [facility] Number: [id number]    Therapy Diagnosis:   Encounter Diagnoses   Name Primary?    Decreased range of motion (ROM) of right knee Yes    Weakness of right lower extremity     Gait disturbance     Old tear of medial meniscus of right knee, unspecified tear type     Old tear of lateral meniscus of right knee, unspecified tear type     Chondromalacia of right knee        Physician: Anshul Arteaga PA-C    Visit Date: 12/18/2023    Physician Orders: PT Eval and Treatment  Medical Diagnosis from Referral: Old tear of medial meniscus of the right knee (M23.203), old tear of lateral meniscus of right knee (M23.200), chondromalacia of right knee (94.261)    Evaluation Date: 9/12/2023  Authorization Period Expiration: 9/6/24  Plan of Care Expiration: 12/31/23  Date of Surgery: 9/11/23  Visit # / Visits authorized: 22 / 30 + Eval  FOTO: 3/ 3    PTA Visit #: 0/5     DATE OF PROCEDURE:  9/11/2023     PROCEDURES:   1. Right knee arthroscopic chondroplasty  2. Right knee arthroscopic partial medial and lateral meniscectomies  3. Right knee arthroscopic loose body removal  4. 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 Reports he saw Dr. Menjivar who cleared him for return to work and was pleased with his progress. Overall he feels good but occasionally feels a little unstable with quicker movements such as stopping or turning on his right leg.     He was compliant with home exercise program.  Pain: Not verbalized /10  Location: right knee      Objective      Objective Measures updated at progress report unless specified.     R Knee PROM from 12/14/23:  Pre-Tx: 0-130    Knee Extension iso at 60° (kg)                 Right Left               1 32.1 37.9               2 33.2 33.8            "    3 35.8 41.2                                   Avg 33.7 37.6                 112% LSI L Involved     11.7% % DIFF L Involved     90% LSI R Involved     -10.5% % DIFF R Involved                       Knee Flexion iso at 60° (kg)                 Right Left               1 20.8 28.5               2 21 28.4               3 22.8 31.3                                   Avg 21.5 29.4                 137% LSI L Involved   37% % DIFF L Involved     76% LSI R Involved   -24% % DIFF R Involved         Treatment     Iván received the treatments listed below:      therapeutic exercises to develop strength, endurance, ROM, and flexibility for 22 minutes including:  Heel prop x8 mins 20#  Red CF Band TKE 2x15x5"  Popliteal gapping hand heel rock x20     Not performed:   SL Knee extension matrix 3 x 10 x 3" hold, 15#    Front Plank 3x30"   BK side plank w/leg lift 3x20"     manual therapy techniques: were applied for 00 minutes, including:      neuromuscular re-education activities to improve: Balance, Coordination, Kinesthetic, Sense, Proprioception, and Motor Control for 25 minutes. The following activities were included:  SL Thruster 15# 3x12 ea.    GTB Lat Band Walk 8# MB reach <-> 25 ft 3x  Tempo 2" down/up Tristanian split squats 4x8 ea.         therapeutic activities to improve functional performance for 14 minutes, including:  Mod SL STS 3x12 ea. 24" box    Not performed:   Sled Push/Pull 70# LOT 2x       Patient Education and Home Exercises       Education provided:   - Reviewed/updated HEP frequency and volume      Written Home Exercises Provided: yes. Exercises were reviewed and Iván was able to demonstrate them prior to the end of the session.  Iván demonstrated good  understanding of the education provided. See EMR under Patient Instructions for exercises provided during therapy sessions    Assessment     Progressed functional strengthening with good control and technique. HEP updated to reflect today's tx. " Emphasis on split squats was eccentric control with emphasis on time under tension which was appropriately challenging for Iván.     Iván Is progressing well towards his goals.   Pt prognosis is Good.     Pt will continue to benefit from skilled outpatient physical therapy to address the deficits listed in the problem list box on initial evaluation, provide pt/family education and to maximize pt's level of independence in the home and community environment.     Pt's spiritual, cultural and educational needs considered and pt agreeable to plan of care and goals.     Anticipated barriers to physical therapy: None    Goals:     Short Term Goals: 0-6 weeks   Patient to demonstrate isolated quad contraction in 2 weeks.   Patient to ambulate with a normal gait pattern in 2 weeks.   Patient to improve active knee extension to 0 degrees in 3 weeks.   Patient to be able to perform 25 SLR without an extensor lag in 4 weeks.  Patient to improve knee flexion to uninvolved side in 4-6 weeks.      Long Term Goals: 6-12 weeks   Patient to return to community ambulation without right knee pain in 6 weeks.   Patient to navigate step/curbs without right knee pain in 6-8 weeks.   Patient to be able to perform all job duties in 6-8 weeks.   Patient to return to exercise routine in 8-12 weeks without modification.     Plan     Outpatient Physical Therapy 2 times weekly for 12 weeks to include the following interventions: Electrical Stimulation IFC/TENS/NMES, Gait Training, Manual Therapy, Moist Heat/ Ice, Neuromuscular Re-ed, Patient Education, Self Care, Therapeutic Activities, Therapeutic Exercise, and Dry Needling.     Renzo Hernandez, PT , DPT  Board Certified in Sports Physical Therapy

## 2024-01-25 ENCOUNTER — OFFICE VISIT (OUTPATIENT)
Dept: SPORTS MEDICINE | Facility: CLINIC | Age: 49
End: 2024-01-25
Payer: COMMERCIAL

## 2024-01-25 VITALS
HEART RATE: 80 BPM | BODY MASS INDEX: 25.38 KG/M2 | SYSTOLIC BLOOD PRESSURE: 122 MMHG | DIASTOLIC BLOOD PRESSURE: 79 MMHG | WEIGHT: 177.25 LBS | HEIGHT: 70 IN

## 2024-01-25 DIAGNOSIS — M17.11 PRIMARY OSTEOARTHRITIS OF RIGHT KNEE: Primary | ICD-10-CM

## 2024-01-25 PROCEDURE — 99499 UNLISTED E&M SERVICE: CPT | Mod: S$GLB,,, | Performed by: ORTHOPAEDIC SURGERY

## 2024-01-25 PROCEDURE — 20610 DRAIN/INJ JOINT/BURSA W/O US: CPT | Mod: RT,S$GLB,, | Performed by: ORTHOPAEDIC SURGERY

## 2024-01-25 PROCEDURE — 99999 PR PBB SHADOW E&M-EST. PATIENT-LVL III: CPT | Mod: PBBFAC,,, | Performed by: ORTHOPAEDIC SURGERY

## 2024-01-25 NOTE — PROGRESS NOTES
Patient is here for follow up of right knee arthritis. Pt is requesting Euflexxa injection #1.    Patient has painful DJD of right knee and has failed other conservative modalities including NSAIDS, activity modification, weight loss and rehabilitation exercises.      Patient has received good relief with these injections in the past.      The prior shots were tolerated well.  RADIOGRAPHIC IMAGING:   Kellgren-Kehinde Grade 2-3.   PHYSICAL EXAMINATION:      General: The patient is alert and oriented x 3. Mood is pleasant.   Observation of ears, eyes and nose reveals no gross abnormalities. No   labored breathing observed.      No signs of infection or adverse reaction to Right knee.      Euflexxa Injection Procedure #1     A time out was performed, including verification of patient ID, procedure, site and side, availability of information and equipment, review of safety issues, and agreement with consent, the procedure site was marked.     The patient was prepped aseptically with povidone-iodine swabsticks. A diagnostic and therapeutic injection of 2cc Euflexxa   was given under sterile technique using a 21.5g x 1.5 needle from the superolateral aspect of the right knee joint in the supine position.       he had no adverse reactions to the medication. Pain decreased. he   was instructed to apply ice to the joint for 20 minutes and avoid strenuous activities for 24-36 hours following the injection. he   was warned of possible blood pressure changes during that time. Following that time, he can resume activities as prior to the injection.     he was reminded to call the clinic immediately for any adverse side effects as explained in clinic today.     Lot: [id number]  Exp: 12/09/2024

## 2024-01-25 NOTE — PROCEDURES
Large Joint Aspiration/Injection: R knee    Date/Time: 1/25/2024 10:30 AM    Performed by: Ricky Menjivar MD  Authorized by: Ricky Menjivar MD    Consent Done?:  Yes (Verbal)  Indications:  Pain  Site marked: the procedure site was marked    Timeout: prior to procedure the correct patient, procedure, and site was verified    Prep: patient was prepped and draped in usual sterile fashion      Details:  Needle Size:  22 G  Ultrasonic Guidance for needle placement?: No    Approach:  Superior  Location:  Knee  Site:  R knee  Medications:  20 mg sodium hyaluronate (EUFLEXXA) 10 mg/mL(mw 2.4 -3.6 million)  Patient tolerance:  Patient tolerated the procedure well with no immediate complications

## 2024-02-01 ENCOUNTER — OFFICE VISIT (OUTPATIENT)
Dept: SPORTS MEDICINE | Facility: CLINIC | Age: 49
End: 2024-02-01
Payer: COMMERCIAL

## 2024-02-01 VITALS
HEART RATE: 79 BPM | BODY MASS INDEX: 25.52 KG/M2 | HEIGHT: 70 IN | DIASTOLIC BLOOD PRESSURE: 75 MMHG | SYSTOLIC BLOOD PRESSURE: 115 MMHG | WEIGHT: 178.25 LBS

## 2024-02-01 DIAGNOSIS — M17.11 PRIMARY OSTEOARTHRITIS OF RIGHT KNEE: Primary | ICD-10-CM

## 2024-02-01 PROCEDURE — 20610 DRAIN/INJ JOINT/BURSA W/O US: CPT | Mod: RT,S$GLB,, | Performed by: ORTHOPAEDIC SURGERY

## 2024-02-01 PROCEDURE — 99499 UNLISTED E&M SERVICE: CPT | Mod: S$GLB,,, | Performed by: ORTHOPAEDIC SURGERY

## 2024-02-01 PROCEDURE — 99999 PR PBB SHADOW E&M-EST. PATIENT-LVL III: CPT | Mod: PBBFAC,,, | Performed by: ORTHOPAEDIC SURGERY

## 2024-02-01 NOTE — PROCEDURES
Large Joint Aspiration/Injection: R knee    Date/Time: 2/1/2024 10:30 AM    Performed by: Ricky Menjivar MD  Authorized by: Ricky Menjivar MD    Consent Done?:  Yes (Verbal)  Indications:  Pain  Site marked: the procedure site was marked    Timeout: prior to procedure the correct patient, procedure, and site was verified    Prep: patient was prepped and draped in usual sterile fashion      Details:  Needle Size:  22 G  Ultrasonic Guidance for needle placement?: No    Approach:  Superior  Location:  Knee  Site:  R knee  Medications:  20 mg sodium hyaluronate (EUFLEXXA) 10 mg/mL(mw 2.4 -3.6 million)  Patient tolerance:  Patient tolerated the procedure well with no immediate complications

## 2024-02-01 NOTE — PROGRESS NOTES
Patient is here for follow up of right knee arthritis. Pt is requesting Euflexxa injection #2.    Patient has painful DJD of right knee and has failed other conservative modalities including NSAIDS, activity modification, weight loss and rehabilitation exercises.      Patient has received good relief with these injections in the past.      The prior shots were tolerated well.  RADIOGRAPHIC IMAGING:   Kellgren-Kehinde Grade 2-3.   PHYSICAL EXAMINATION:      General: The patient is alert and oriented x 3. Mood is pleasant.   Observation of ears, eyes and nose reveals no gross abnormalities. No   labored breathing observed.      No signs of infection or adverse reaction to Right knee.      Euflexxa Injection Procedure #2     A time out was performed, including verification of patient ID, procedure, site and side, availability of information and equipment, review of safety issues, and agreement with consent, the procedure site was marked.     The patient was prepped aseptically with povidone-iodine swabsticks. A diagnostic and therapeutic injection of 2cc Euflexxa   was given under sterile technique using a 21.5g x 1.5 needle from the superolateral aspect of the right knee joint in the supine position.       he had no adverse reactions to the medication. Pain decreased. he   was instructed to apply ice to the joint for 20 minutes and avoid strenuous activities for 24-36 hours following the injection. he   was warned of possible blood pressure changes during that time. Following that time, he can resume activities as prior to the injection.     he was reminded to call the clinic immediately for any adverse side effects as explained in clinic today.     Lot: [id number]  Exp: 12/09/2024

## 2024-12-07 NOTE — PATIENT INSTRUCTIONS
[address], [place]                                                                                          [phone]                   Postoperative Instructions for Knee Surgery                 Your Surgery Included:   Open               Arthroscopic   [] Ligament Repair       [] Diagnostic           [] ACL     [] PCL     [] MCL     [] PLLC      [] Synovectomy / Plica Removal [] Meniscal Cartilage Repair / Transplantation      [] Lysis of Adhesions / Manipulation [] Articular Cartilage Repair      [] Interval Release           [] Microfracture       [] OATS   [] ACI      [x] Meniscectomy           [] Osteochondral Allograft      [] Meniscal Cartilage Repair  [] Patellar Realignment       [x] Debridement / Chondroplasty         [] Lateral Release   [] Ligament Repair      [] Articular Cartilage Repair          [] Extensor Mechanism             []   Microfracture  []  OATS         []  Cartilage Biopsy [] Tendon Repair          [] Ligament Reconstruction          [] Patella                  [] Quadriceps             []   ACL    []   PCL  [] High Tibial Osteotomy       [] PRP Arthrocentesis  [] Joint Replacement         [] Amniox Arthrocentesis           [] Unicompartmental   [] Patellofemoral                    [] Total Knee                  Call our office ([phone]) immediately if you experience any of the following:       Excessive bleeding or pus like drainage at the incision site       Uncontrollable pain not relieved by pain medication       Excessive swelling or redness at the incision site       Fever above 101.5 degrees not controlled with Tylenol or Motrin       Shortness of Breath       Any foul odor or blistering from the surgery site    FOR EMERGENCIES: If any unusual problems or difficulties occur, call our office at [phone], or page the  at [phone] who will direct your call appropriately    1.   Pain Management: A cold therapy cuff, pain  medications, local injections, and in some cases, regional anesthesia injections are used to manage your post-operative pain. The decision to use each of these options is based on their risks and benefits.     Medications: You were given one or more of the following medication prescriptions before leaving the hospital. Have the prescriptions filled at a pharmacy on your way home and follow the instructions on the bottles. If you need a refill, please call your pharmacy.      Narcotic Medication (usually Norco/Hydrocodone APAP, Percocet/Oxycodone APAP, Roxicodone, or Tramadol): Begin taking the medication before your knee starts to hurt. Some patients do not like to take any medication, but if you wait until your pain is severe before taking, you will be very uncomfortable for several hours waiting for the narcotic to work. Always take with food.     Nausea / Vomiting: For this issue, we prescribe Phenergan, use this medication as directed.     Cold Therapy: You may have been sent home with a [facility] cold therapy unit and wrap for your knee. Fill with ice and water, or frozen water bottles with water, to the indicated fill line and use throughout the day for the first two days and then as needed to help relieve pain and control swelling. Ice the knee in 30 minute intervals, and do not place the wrap directly onto the skin.     Regional Anesthesia Injections (Blocks): You may have been given a regional nerve block either before or after surgery. This may make your entire leg numb for 24-36 hours.                            * Proceed with caution when bearing weight on your leg.     2.   Diet: Eat a bland diet for the first day after surgery. Progress your diet as tolerated. Constipation may occur with Narcotic usage, contact our office if you are experiencing constipation.    3.   Activity: Limit your activity during the first 48 hours, keep your leg elevated with pillows under your heel. After the first 48 hours  "at home, increase your activity level based on your symptoms.    4.   Dressing Change: Remove the soft dressing on the 3rd day. IF YOU HAVE A TAN AQUACEL BANDAGE ON YOUR KNEE, DO NOT REMOVE THIS. It is normal for some blood to be seen on the dressings. It is also normal for you to see apparent bruising on the skin around your knee when you remove the dressing. If present, leave the steri-strip tape across the incisions. If you are concerned by the drainage or the appearance of your knee, please call one of the numbers listed below.    5.   Showering: You may shower on the 3rd day after surgery with waterproof bandages over small incisions. If you have an incision with Prineo (clear mesh), it does not need to be covered. Do not submerge in any water until after your postoperative appointment in clinic.    6.   Your procedure did not require a post-operative brace.    7.   Your procedure did not require a Continuous Passive Motion (CPM) device.    8.   Weight Bearing: You may have been sent home with crutches. If instructed (see below), use these crutches at all times unless at complete rest.      [x] Full weight bearing as tolerated           [x]  NOW        9.  Knee Exercises: Begin these exercises the first day after surgery in order to help you regain your motion and strength. You may do the following marked exercises:     [x] Quad Sets - Begin activating your quadriceps muscle by driving your          knee downward into full knee extension while seated on a table or bed   with a towel rolled and propped under your heel     [x] Straight Leg Raise (SLR) - While irwin your quadriceps muscle, lift     your fully extended leg to the level of your non-operative knee (as shown)     [x] Heel Slides - With the knee straight, slide your heel slowly toward your   buttocks, hold at the endpoint for 10-15 seconds, then slowly straighten     [x] Ankle pumps - With your knee straight, move your ankle in a "pumping"  "   fashion to activate your calf and leg muscles      10.  Physical Therapy: Physical therapy is an essential component to your recovery from surgery. Your physical therapy will start in 1 days.    FIRST POSTOPERATIVE VISIT: As scheduled.        Principal Discharge DX:	Acute pancreatitis   1 Principal Discharge DX:	Acute pancreatitis  Secondary Diagnosis:	Sepsis

## (undated) DEVICE — SOL NACL IRR 3000ML

## (undated) DEVICE — SUT MONOCRYL 4-0 PS-2

## (undated) DEVICE — PAD CAST SPECIALIST STRL 6

## (undated) DEVICE — SHAVER SYS 5.5 ULRAFRR

## (undated) DEVICE — PAD ELECTRODE STER 1.5X3

## (undated) DEVICE — DRESSING XEROFORM NONADH 1X8IN

## (undated) DEVICE — DRAPE STERI U-SHAPED 47X51IN

## (undated) DEVICE — PAD ABDOMINAL STERILE 8X10IN

## (undated) DEVICE — GAUZE SPONGE 4X4 12PLY

## (undated) DEVICE — TUBE SET INFLOW/OUTFLOW

## (undated) DEVICE — GLOVE SENSICARE PI GRN 8

## (undated) DEVICE — BLADE SHAVER LANZA 4.2X13CM

## (undated) DEVICE — GLOVE SENSICARE PI SURG 8

## (undated) DEVICE — CLOSURE SKIN STERI STRIP 1/2X4

## (undated) DEVICE — DRAPE ARTHSCP FLD CTRL POUCH

## (undated) DEVICE — ELECTRODE 90 DEGREE ANGLE

## (undated) DEVICE — ADHESIVE MASTISOL VIAL 48/BX

## (undated) DEVICE — UNDERGLOVES BIOGEL PI SIZE 7.5

## (undated) DEVICE — WRAP KNEE ACCU THERM GEL PACK

## (undated) DEVICE — DRAPE TOP 53X102IN

## (undated) DEVICE — Device

## (undated) DEVICE — GOWN ECLIPSE REINF LV4 XLNG XL

## (undated) DEVICE — TOURNIQUET SB QC DP 34X4IN

## (undated) DEVICE — PAD ABD 8X10 STERILE

## (undated) DEVICE — STRIP MEDI WND CLSR 1/2X4IN

## (undated) DEVICE — SOL NACL IRR 1000ML BTL